# Patient Record
Sex: MALE | Race: OTHER | NOT HISPANIC OR LATINO | URBAN - METROPOLITAN AREA
[De-identification: names, ages, dates, MRNs, and addresses within clinical notes are randomized per-mention and may not be internally consistent; named-entity substitution may affect disease eponyms.]

---

## 2017-04-14 ENCOUNTER — INPATIENT (INPATIENT)
Facility: HOSPITAL | Age: 52
LOS: 2 days | Discharge: HOME | End: 2017-04-17
Attending: HOSPITALIST | Admitting: HOSPITALIST
Payer: MEDICAID

## 2017-04-14 VITALS
OXYGEN SATURATION: 98 % | HEART RATE: 94 BPM | TEMPERATURE: 97 F | SYSTOLIC BLOOD PRESSURE: 152 MMHG | RESPIRATION RATE: 18 BRPM | DIASTOLIC BLOOD PRESSURE: 108 MMHG

## 2017-04-14 LAB
ALBUMIN SERPL ELPH-MCNC: 3.9 G/DL — SIGNIFICANT CHANGE UP (ref 3.3–5)
ALP SERPL-CCNC: 68 U/L — SIGNIFICANT CHANGE UP (ref 40–120)
ALT FLD-CCNC: 28 U/L — SIGNIFICANT CHANGE UP (ref 4–41)
APAP SERPL-MCNC: < 15 UG/ML — LOW (ref 15–25)
AST SERPL-CCNC: 21 U/L — SIGNIFICANT CHANGE UP (ref 4–40)
BARBITURATES MEASUREMENT: NEGATIVE — SIGNIFICANT CHANGE UP
BASE EXCESS BLDV CALC-SCNC: -1.2 MMOL/L — SIGNIFICANT CHANGE UP
BASOPHILS # BLD AUTO: 0.02 K/UL — SIGNIFICANT CHANGE UP (ref 0–0.2)
BASOPHILS NFR BLD AUTO: 0.3 % — SIGNIFICANT CHANGE UP (ref 0–2)
BENZODIAZ SERPL-MCNC: NEGATIVE — SIGNIFICANT CHANGE UP
BILIRUB SERPL-MCNC: 0.5 MG/DL — SIGNIFICANT CHANGE UP (ref 0.2–1.2)
BLOOD GAS VENOUS - CREATININE: 0.98 MG/DL — SIGNIFICANT CHANGE UP (ref 0.5–1.3)
BUN SERPL-MCNC: 16 MG/DL — SIGNIFICANT CHANGE UP (ref 7–23)
CALCIUM SERPL-MCNC: 8.8 MG/DL — SIGNIFICANT CHANGE UP (ref 8.4–10.5)
CHLORIDE BLDV-SCNC: 102 MMOL/L — SIGNIFICANT CHANGE UP (ref 96–108)
CHLORIDE SERPL-SCNC: 92 MMOL/L — LOW (ref 98–107)
CO2 SERPL-SCNC: 21 MMOL/L — LOW (ref 22–31)
CREAT SERPL-MCNC: 1.06 MG/DL — SIGNIFICANT CHANGE UP (ref 0.5–1.3)
EOSINOPHIL # BLD AUTO: 0.15 K/UL — SIGNIFICANT CHANGE UP (ref 0–0.5)
EOSINOPHIL NFR BLD AUTO: 2.4 % — SIGNIFICANT CHANGE UP (ref 0–6)
ETHANOL BLD-MCNC: 253 MG/DL — HIGH
GAS PNL BLDV: 135 MMOL/L — LOW (ref 136–146)
GLUCOSE BLDV-MCNC: 492 — CRITICAL HIGH (ref 70–99)
GLUCOSE SERPL-MCNC: 609 MG/DL — CRITICAL HIGH (ref 70–99)
HCO3 BLDV-SCNC: 23 MMOL/L — SIGNIFICANT CHANGE UP (ref 20–27)
HCT VFR BLD CALC: 45.5 % — SIGNIFICANT CHANGE UP (ref 39–50)
HCT VFR BLDV CALC: 49.2 % — SIGNIFICANT CHANGE UP (ref 39–51)
HGB BLD-MCNC: 15.9 G/DL — SIGNIFICANT CHANGE UP (ref 13–17)
HGB BLDV-MCNC: 16.1 G/DL — SIGNIFICANT CHANGE UP (ref 13–17)
IMM GRANULOCYTES NFR BLD AUTO: 0.2 % — SIGNIFICANT CHANGE UP (ref 0–1.5)
LACTATE BLDV-MCNC: 3.8 MMOL/L — HIGH (ref 0.5–2)
LYMPHOCYTES # BLD AUTO: 2.51 K/UL — SIGNIFICANT CHANGE UP (ref 1–3.3)
LYMPHOCYTES # BLD AUTO: 40.3 % — SIGNIFICANT CHANGE UP (ref 13–44)
MCHC RBC-ENTMCNC: 32.6 PG — SIGNIFICANT CHANGE UP (ref 27–34)
MCHC RBC-ENTMCNC: 34.9 % — SIGNIFICANT CHANGE UP (ref 32–36)
MCV RBC AUTO: 93.4 FL — SIGNIFICANT CHANGE UP (ref 80–100)
MONOCYTES # BLD AUTO: 0.42 K/UL — SIGNIFICANT CHANGE UP (ref 0–0.9)
MONOCYTES NFR BLD AUTO: 6.7 % — SIGNIFICANT CHANGE UP (ref 2–14)
NEUTROPHILS # BLD AUTO: 3.12 K/UL — SIGNIFICANT CHANGE UP (ref 1.8–7.4)
NEUTROPHILS NFR BLD AUTO: 50.1 % — SIGNIFICANT CHANGE UP (ref 43–77)
PCO2 BLDV: 43 MMHG — SIGNIFICANT CHANGE UP (ref 41–51)
PH BLDV: 7.36 PH — SIGNIFICANT CHANGE UP (ref 7.32–7.43)
PLATELET # BLD AUTO: 163 K/UL — SIGNIFICANT CHANGE UP (ref 150–400)
PMV BLD: 10.7 FL — SIGNIFICANT CHANGE UP (ref 7–13)
PO2 BLDV: 50 MMHG — HIGH (ref 35–40)
POTASSIUM BLDV-SCNC: 3.5 MMOL/L — SIGNIFICANT CHANGE UP (ref 3.4–4.5)
POTASSIUM SERPL-MCNC: 3.6 MMOL/L — SIGNIFICANT CHANGE UP (ref 3.5–5.3)
POTASSIUM SERPL-SCNC: 3.6 MMOL/L — SIGNIFICANT CHANGE UP (ref 3.5–5.3)
PROT SERPL-MCNC: 7.4 G/DL — SIGNIFICANT CHANGE UP (ref 6–8.3)
RBC # BLD: 4.87 M/UL — SIGNIFICANT CHANGE UP (ref 4.2–5.8)
RBC # FLD: 12.9 % — SIGNIFICANT CHANGE UP (ref 10.3–14.5)
SALICYLATES SERPL-MCNC: < 5 MG/DL — LOW (ref 15–30)
SAO2 % BLDV: 79.6 % — SIGNIFICANT CHANGE UP (ref 60–85)
SODIUM SERPL-SCNC: 135 MMOL/L — SIGNIFICANT CHANGE UP (ref 135–145)
WBC # BLD: 6.23 K/UL — SIGNIFICANT CHANGE UP (ref 3.8–10.5)
WBC # FLD AUTO: 6.23 K/UL — SIGNIFICANT CHANGE UP (ref 3.8–10.5)

## 2017-04-14 RX ORDER — SODIUM CHLORIDE 9 MG/ML
1000 INJECTION INTRAMUSCULAR; INTRAVENOUS; SUBCUTANEOUS ONCE
Qty: 0 | Refills: 0 | Status: COMPLETED | OUTPATIENT
Start: 2017-04-14 | End: 2017-04-14

## 2017-04-14 RX ORDER — SODIUM CHLORIDE 9 MG/ML
2000 INJECTION INTRAMUSCULAR; INTRAVENOUS; SUBCUTANEOUS ONCE
Qty: 0 | Refills: 0 | Status: COMPLETED | OUTPATIENT
Start: 2017-04-14 | End: 2017-04-14

## 2017-04-14 RX ORDER — DIPHENHYDRAMINE HCL 50 MG
50 CAPSULE ORAL ONCE
Qty: 0 | Refills: 0 | Status: COMPLETED | OUTPATIENT
Start: 2017-04-14 | End: 2017-04-14

## 2017-04-14 RX ORDER — HALOPERIDOL DECANOATE 100 MG/ML
5 INJECTION INTRAMUSCULAR ONCE
Qty: 0 | Refills: 0 | Status: COMPLETED | OUTPATIENT
Start: 2017-04-14 | End: 2017-04-14

## 2017-04-14 RX ORDER — INSULIN LISPRO 100/ML
6 VIAL (ML) SUBCUTANEOUS ONCE
Qty: 0 | Refills: 0 | Status: COMPLETED | OUTPATIENT
Start: 2017-04-14 | End: 2017-04-14

## 2017-04-14 RX ADMIN — Medication 2 MILLIGRAM(S): at 23:39

## 2017-04-14 RX ADMIN — Medication 2 MILLIGRAM(S): at 21:23

## 2017-04-14 RX ADMIN — Medication 6 UNIT(S): at 23:11

## 2017-04-14 RX ADMIN — HALOPERIDOL DECANOATE 5 MILLIGRAM(S): 100 INJECTION INTRAMUSCULAR at 21:23

## 2017-04-14 RX ADMIN — Medication 50 MILLIGRAM(S): at 22:19

## 2017-04-14 RX ADMIN — SODIUM CHLORIDE 2000 MILLILITER(S): 9 INJECTION INTRAMUSCULAR; INTRAVENOUS; SUBCUTANEOUS at 23:12

## 2017-04-14 NOTE — ED ADULT NURSE NOTE - OBJECTIVE STATEMENT
rec'd pt in room 14 on 4 point restraints. pt agitated, restless, screaming, threatening staff..bleeding from head. pt on 1:1...safety maintained. placed on cardiac monitor and end tidal co2 monitor. small lac to left temporal region...pt agitated...area assessed and general hygiene performed. given meds as ordered. 18 gauge inserted rigth ac. bloods sent. for re-eval
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
## 2017-04-14 NOTE — ED PROVIDER NOTE - CRITICAL CARE PROVIDED
direct patient care (not related to procedure)/consult w/ pt's family directly relating to pts condition/interpretation of diagnostic studies/consultation with other physicians/additional history taking
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
## 2017-04-14 NOTE — ED PROVIDER NOTE - MEDICAL DECISION MAKING DETAILS
Pt p/w acute alcohol intoxication and aggressive behavior requiring chemical and mechanical restraints for his safety and the safety of the staff. Will obtain CTH/C spine, XR's chest and pelvis, staple scalp lac, obtain basic labs including toxicology screen, and reassess when sober.

## 2017-04-15 DIAGNOSIS — R73.9 HYPERGLYCEMIA, UNSPECIFIED: ICD-10-CM

## 2017-04-15 DIAGNOSIS — E11.9 TYPE 2 DIABETES MELLITUS WITHOUT COMPLICATIONS: ICD-10-CM

## 2017-04-15 DIAGNOSIS — Z41.8 ENCOUNTER FOR OTHER PROCEDURES FOR PURPOSES OTHER THAN REMEDYING HEALTH STATE: ICD-10-CM

## 2017-04-15 DIAGNOSIS — R94.31 ABNORMAL ELECTROCARDIOGRAM [ECG] [EKG]: ICD-10-CM

## 2017-04-15 DIAGNOSIS — F10.10 ALCOHOL ABUSE, UNCOMPLICATED: ICD-10-CM

## 2017-04-15 LAB
ALBUMIN SERPL ELPH-MCNC: 3.7 G/DL — SIGNIFICANT CHANGE UP (ref 3.3–5)
ALP SERPL-CCNC: 57 U/L — SIGNIFICANT CHANGE UP (ref 40–120)
ALT FLD-CCNC: 29 U/L — SIGNIFICANT CHANGE UP (ref 4–41)
AMPHET UR-MCNC: NEGATIVE — SIGNIFICANT CHANGE UP
APPEARANCE UR: CLEAR — SIGNIFICANT CHANGE UP
AST SERPL-CCNC: 27 U/L — SIGNIFICANT CHANGE UP (ref 4–40)
BARBITURATES UR SCN-MCNC: NEGATIVE — SIGNIFICANT CHANGE UP
BENZODIAZ UR-MCNC: NEGATIVE — SIGNIFICANT CHANGE UP
BILIRUB SERPL-MCNC: 0.5 MG/DL — SIGNIFICANT CHANGE UP (ref 0.2–1.2)
BILIRUB UR-MCNC: NEGATIVE — SIGNIFICANT CHANGE UP
BLOOD UR QL VISUAL: NEGATIVE — SIGNIFICANT CHANGE UP
BUN SERPL-MCNC: 12 MG/DL — SIGNIFICANT CHANGE UP (ref 7–23)
BUN SERPL-MCNC: 14 MG/DL — SIGNIFICANT CHANGE UP (ref 7–23)
BUN SERPL-MCNC: 15 MG/DL — SIGNIFICANT CHANGE UP (ref 7–23)
CALCIUM SERPL-MCNC: 7.8 MG/DL — LOW (ref 8.4–10.5)
CALCIUM SERPL-MCNC: 7.8 MG/DL — LOW (ref 8.4–10.5)
CALCIUM SERPL-MCNC: 8.1 MG/DL — LOW (ref 8.4–10.5)
CANNABINOIDS UR-MCNC: NEGATIVE — SIGNIFICANT CHANGE UP
CHLORIDE SERPL-SCNC: 102 MMOL/L — SIGNIFICANT CHANGE UP (ref 98–107)
CHLORIDE SERPL-SCNC: 103 MMOL/L — SIGNIFICANT CHANGE UP (ref 98–107)
CHLORIDE SERPL-SCNC: 107 MMOL/L — SIGNIFICANT CHANGE UP (ref 98–107)
CK MB BLD-MCNC: 1 — SIGNIFICANT CHANGE UP (ref 0–2.5)
CK MB BLD-MCNC: 1.88 NG/ML — SIGNIFICANT CHANGE UP (ref 1–6.6)
CK MB BLD-MCNC: 2.36 NG/ML — SIGNIFICANT CHANGE UP (ref 1–6.6)
CK MB BLD-MCNC: 2.61 NG/ML — SIGNIFICANT CHANGE UP (ref 1–6.6)
CK SERPL-CCNC: 235 U/L — HIGH (ref 30–200)
CK SERPL-CCNC: 280 U/L — HIGH (ref 30–200)
CO2 SERPL-SCNC: 20 MMOL/L — LOW (ref 22–31)
CO2 SERPL-SCNC: 21 MMOL/L — LOW (ref 22–31)
CO2 SERPL-SCNC: 22 MMOL/L — SIGNIFICANT CHANGE UP (ref 22–31)
COCAINE METAB.OTHER UR-MCNC: NEGATIVE — SIGNIFICANT CHANGE UP
COLOR SPEC: SIGNIFICANT CHANGE UP
CREAT SERPL-MCNC: 0.74 MG/DL — SIGNIFICANT CHANGE UP (ref 0.5–1.3)
CREAT SERPL-MCNC: 0.82 MG/DL — SIGNIFICANT CHANGE UP (ref 0.5–1.3)
CREAT SERPL-MCNC: 0.9 MG/DL — SIGNIFICANT CHANGE UP (ref 0.5–1.3)
GLUCOSE SERPL-MCNC: 155 MG/DL — HIGH (ref 70–99)
GLUCOSE SERPL-MCNC: 253 MG/DL — HIGH (ref 70–99)
GLUCOSE SERPL-MCNC: 311 MG/DL — HIGH (ref 70–99)
GLUCOSE UR-MCNC: >1000 — SIGNIFICANT CHANGE UP
KETONES UR-MCNC: SIGNIFICANT CHANGE UP
LEUKOCYTE ESTERASE UR-ACNC: NEGATIVE — SIGNIFICANT CHANGE UP
MAGNESIUM SERPL-MCNC: 1.9 MG/DL — SIGNIFICANT CHANGE UP (ref 1.6–2.6)
MAGNESIUM SERPL-MCNC: 2 MG/DL — SIGNIFICANT CHANGE UP (ref 1.6–2.6)
METHADONE UR-MCNC: NEGATIVE — SIGNIFICANT CHANGE UP
MUCOUS THREADS # UR AUTO: SIGNIFICANT CHANGE UP
NITRITE UR-MCNC: NEGATIVE — SIGNIFICANT CHANGE UP
OPIATES UR-MCNC: NEGATIVE — SIGNIFICANT CHANGE UP
OXYCODONE UR-MCNC: NEGATIVE — SIGNIFICANT CHANGE UP
PCP UR-MCNC: NEGATIVE — SIGNIFICANT CHANGE UP
PH UR: 6 — SIGNIFICANT CHANGE UP (ref 4.6–8)
PHOSPHATE SERPL-MCNC: 2.2 MG/DL — LOW (ref 2.5–4.5)
PHOSPHATE SERPL-MCNC: 2.4 MG/DL — LOW (ref 2.5–4.5)
POTASSIUM SERPL-MCNC: 3.7 MMOL/L — SIGNIFICANT CHANGE UP (ref 3.5–5.3)
POTASSIUM SERPL-MCNC: 3.7 MMOL/L — SIGNIFICANT CHANGE UP (ref 3.5–5.3)
POTASSIUM SERPL-MCNC: 4.1 MMOL/L — SIGNIFICANT CHANGE UP (ref 3.5–5.3)
POTASSIUM SERPL-SCNC: 3.7 MMOL/L — SIGNIFICANT CHANGE UP (ref 3.5–5.3)
POTASSIUM SERPL-SCNC: 3.7 MMOL/L — SIGNIFICANT CHANGE UP (ref 3.5–5.3)
POTASSIUM SERPL-SCNC: 4.1 MMOL/L — SIGNIFICANT CHANGE UP (ref 3.5–5.3)
PROT SERPL-MCNC: 6.7 G/DL — SIGNIFICANT CHANGE UP (ref 6–8.3)
PROT UR-MCNC: NEGATIVE — SIGNIFICANT CHANGE UP
RBC CASTS # UR COMP ASSIST: SIGNIFICANT CHANGE UP (ref 0–?)
SODIUM SERPL-SCNC: 139 MMOL/L — SIGNIFICANT CHANGE UP (ref 135–145)
SODIUM SERPL-SCNC: 140 MMOL/L — SIGNIFICANT CHANGE UP (ref 135–145)
SODIUM SERPL-SCNC: 143 MMOL/L — SIGNIFICANT CHANGE UP (ref 135–145)
SP GR SPEC: 1.03 — HIGH (ref 1–1.03)
TROPONIN T SERPL-MCNC: < 0.06 NG/ML — SIGNIFICANT CHANGE UP (ref 0–0.06)
UROBILINOGEN FLD QL: NORMAL E.U. — SIGNIFICANT CHANGE UP (ref 0.1–0.2)
WBC UR QL: SIGNIFICANT CHANGE UP (ref 0–?)

## 2017-04-15 PROCEDURE — 71010: CPT | Mod: 26

## 2017-04-15 PROCEDURE — 72125 CT NECK SPINE W/O DYE: CPT | Mod: 26

## 2017-04-15 PROCEDURE — 70450 CT HEAD/BRAIN W/O DYE: CPT | Mod: 26

## 2017-04-15 PROCEDURE — 93010 ELECTROCARDIOGRAM REPORT: CPT

## 2017-04-15 PROCEDURE — 99223 1ST HOSP IP/OBS HIGH 75: CPT | Mod: GC

## 2017-04-15 PROCEDURE — 72170 X-RAY EXAM OF PELVIS: CPT | Mod: 26

## 2017-04-15 RX ORDER — GLUCAGON INJECTION, SOLUTION 0.5 MG/.1ML
1 INJECTION, SOLUTION SUBCUTANEOUS ONCE
Qty: 0 | Refills: 0 | Status: DISCONTINUED | OUTPATIENT
Start: 2017-04-15 | End: 2017-04-17

## 2017-04-15 RX ORDER — THIAMINE MONONITRATE (VIT B1) 100 MG
100 TABLET ORAL DAILY
Qty: 0 | Refills: 0 | Status: COMPLETED | OUTPATIENT
Start: 2017-04-15 | End: 2017-04-17

## 2017-04-15 RX ORDER — FOLIC ACID 0.8 MG
1 TABLET ORAL DAILY
Qty: 0 | Refills: 0 | Status: DISCONTINUED | OUTPATIENT
Start: 2017-04-15 | End: 2017-04-17

## 2017-04-15 RX ORDER — SODIUM CHLORIDE 9 MG/ML
1000 INJECTION, SOLUTION INTRAVENOUS
Qty: 0 | Refills: 0 | Status: DISCONTINUED | OUTPATIENT
Start: 2017-04-15 | End: 2017-04-17

## 2017-04-15 RX ORDER — INSULIN LISPRO 100/ML
VIAL (ML) SUBCUTANEOUS
Qty: 0 | Refills: 0 | Status: DISCONTINUED | OUTPATIENT
Start: 2017-04-15 | End: 2017-04-17

## 2017-04-15 RX ORDER — INSULIN LISPRO 100/ML
VIAL (ML) SUBCUTANEOUS AT BEDTIME
Qty: 0 | Refills: 0 | Status: DISCONTINUED | OUTPATIENT
Start: 2017-04-15 | End: 2017-04-17

## 2017-04-15 RX ORDER — SODIUM,POTASSIUM PHOSPHATES 278-250MG
1 POWDER IN PACKET (EA) ORAL
Qty: 0 | Refills: 0 | Status: COMPLETED | OUTPATIENT
Start: 2017-04-15 | End: 2017-04-16

## 2017-04-15 RX ORDER — DEXTROSE 50 % IN WATER 50 %
12.5 SYRINGE (ML) INTRAVENOUS ONCE
Qty: 0 | Refills: 0 | Status: DISCONTINUED | OUTPATIENT
Start: 2017-04-15 | End: 2017-04-17

## 2017-04-15 RX ORDER — DEXTROSE 50 % IN WATER 50 %
25 SYRINGE (ML) INTRAVENOUS ONCE
Qty: 0 | Refills: 0 | Status: DISCONTINUED | OUTPATIENT
Start: 2017-04-15 | End: 2017-04-17

## 2017-04-15 RX ORDER — SODIUM CHLORIDE 9 MG/ML
1000 INJECTION INTRAMUSCULAR; INTRAVENOUS; SUBCUTANEOUS
Qty: 0 | Refills: 0 | Status: DISCONTINUED | OUTPATIENT
Start: 2017-04-15 | End: 2017-04-17

## 2017-04-15 RX ORDER — DEXTROSE 50 % IN WATER 50 %
1 SYRINGE (ML) INTRAVENOUS ONCE
Qty: 0 | Refills: 0 | Status: DISCONTINUED | OUTPATIENT
Start: 2017-04-15 | End: 2017-04-17

## 2017-04-15 RX ORDER — ENOXAPARIN SODIUM 100 MG/ML
40 INJECTION SUBCUTANEOUS DAILY
Qty: 0 | Refills: 0 | Status: DISCONTINUED | OUTPATIENT
Start: 2017-04-15 | End: 2017-04-17

## 2017-04-15 RX ORDER — INSULIN GLARGINE 100 [IU]/ML
10 INJECTION, SOLUTION SUBCUTANEOUS AT BEDTIME
Qty: 0 | Refills: 0 | Status: DISCONTINUED | OUTPATIENT
Start: 2017-04-15 | End: 2017-04-16

## 2017-04-15 RX ORDER — SODIUM,POTASSIUM PHOSPHATES 278-250MG
1 POWDER IN PACKET (EA) ORAL
Qty: 0 | Refills: 0 | Status: DISCONTINUED | OUTPATIENT
Start: 2017-04-15 | End: 2017-04-15

## 2017-04-15 RX ADMIN — SODIUM CHLORIDE 100 MILLILITER(S): 9 INJECTION INTRAMUSCULAR; INTRAVENOUS; SUBCUTANEOUS at 23:08

## 2017-04-15 RX ADMIN — SODIUM CHLORIDE 1000 MILLILITER(S): 9 INJECTION INTRAMUSCULAR; INTRAVENOUS; SUBCUTANEOUS at 01:19

## 2017-04-15 RX ADMIN — Medication 50 MILLIGRAM(S): at 07:26

## 2017-04-15 RX ADMIN — Medication: at 07:24

## 2017-04-15 RX ADMIN — Medication 63.75 MILLIMOLE(S): at 14:58

## 2017-04-15 RX ADMIN — ENOXAPARIN SODIUM 40 MILLIGRAM(S): 100 INJECTION SUBCUTANEOUS at 13:49

## 2017-04-15 RX ADMIN — Medication 1 TABLET(S): at 13:50

## 2017-04-15 RX ADMIN — Medication 1 MILLIGRAM(S): at 01:43

## 2017-04-15 RX ADMIN — Medication 100 MILLIGRAM(S): at 13:50

## 2017-04-15 RX ADMIN — Medication 1: at 18:07

## 2017-04-15 RX ADMIN — SODIUM CHLORIDE 100 MILLILITER(S): 9 INJECTION INTRAMUSCULAR; INTRAVENOUS; SUBCUTANEOUS at 11:58

## 2017-04-15 RX ADMIN — Medication: at 12:47

## 2017-04-15 RX ADMIN — Medication 1 MILLIGRAM(S): at 13:50

## 2017-04-15 RX ADMIN — INSULIN GLARGINE 10 UNIT(S): 100 INJECTION, SOLUTION SUBCUTANEOUS at 23:07

## 2017-04-15 NOTE — H&P ADULT. - ATTENDING COMMENTS
Pt was seen and examed at bed side with resident.  52 yo M w/ PMH of DM presents with head laceration, aggression and AMS 2/2 to alcohol intoxication without signs of ICH, found to have hyperglycemia likely 2/2 DM without signs of DKA or HHS, also found to have ST changes in his anterior leads without other signs and symptoms of ACS. symptom triggered CIWA/ativan protocol, folate, MVI. Abnormal EKG, pt asymptomatic, serial Milo, ASA. consider out patient cardiology evaluation if pt symptomatic, optimizing DM management, alcohol abstinence. Uncontrolled DMII, medication non-compliance, Hg A1C 12, started lantus, HISS, need DM education. consider Endo consult. Liprid profile.

## 2017-04-15 NOTE — PATIENT PROFILE ADULT. - NS TRANSFER DISPOSITION PATIENT BELONGINGS
cell phone and wallet with [id number] retrieved from security and given to patient. Patient aware we are not responsible for belongings.. Copy of valuable taken from security in chart./with patient

## 2017-04-15 NOTE — H&P ADULT. - ASSESSMENT
50 yo M w/ PMH of DM presents with head laceration, aggression and AMS 2/2 to alcohol intoxication without signs of ICH, found to have hyperglycemia likely 2/2 DM without signs of DKA or HHS, also found to have ST changes in his anterior leads without other signs and symptoms of ACS. 50 yo M w/ PMH of DM presents with head laceration, aggression and AMS 2/2 to alcohol intoxication without signs of ICH, found to have hyperglycemia likely 2/2 DM without signs of DKA or HHS, also found to have ST changes in his anterior leads without other signs and symptoms of ACS. According to patient's history obtained from family, patient at low risk of alcohol withdrawal as this episode was due to a single alcoholic binge and that patient does not drink daily.

## 2017-04-15 NOTE — ED ADULT NURSE REASSESSMENT NOTE - CONDITION
improved/pt easily arousable but slightly drowsy.  admits to having 3 beers and several shots last night. pt cooperative at this time. verbal frepofrt given to caterina mejia.  king for tfr to floor while on co. safety maintained
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
## 2017-04-15 NOTE — H&P ADULT. - EKG AND INTERPRETATION
RRR, QTc 464, ST depressions in V1 to V4, T-wave inversion and q-wave in III RRR, QTc 464, widen QRS V1 to V4, T-wave inversion and q-wave in III

## 2017-04-15 NOTE — H&P ADULT. - PROBLEM SELECTOR PLAN 3
- monitor for signs of ACS  - serials EKGs, serial cardiac enzymes - monitor for signs of ACS  - serials EKGs, serial cardiac enzymes  - [facility] cardiology reviewed EKG, Pt asymptomatic, no indication for inpatient cardiac work up.

## 2017-04-15 NOTE — H&P ADULT. - PROBLEM SELECTOR PLAN 1
- Librium taper  - CIWA triggered Ativan prn  - monitor mental status  - monitor for signs of alcoholic seizures, hallucinosis, DTs  - Social work consult - Holding benzo taper as patient ts lower risk of alcohol withdrawal  - CIWA triggered Ativan prn  - monitor mental status  - monitor for signs of alcohol withdrawal, increase in CIWA, alcoholic seizures, hallucinosis, DTs - Holding benzo taper as patient ts lower risk of alcohol withdrawal  - CIWA triggered Ativan prn  - monitor mental status  - monitor for signs of alcohol withdrawal, increase in CIWA, alcoholic seizures, hallucinosis, DTs  - SW consult

## 2017-04-15 NOTE — H&P ADULT. - PROBLEM SELECTOR PLAN 2
- Fingersticks premeal, ISS, Lantus bedtime  - monitor anion gap - Fingersticks premeal, ISS, Lantus bedtime  - monitor anion gap  - IVF resuscitation  - patient will need diabetes education, new medication regime on discharge -mild DKA with postive anion gap, trace ketone in UA. improved  - Fingersticks premeal, ISS, Lantus bedtime  - monitor anion gap, once closed, will start oral diet as tolerated if mental status improving.  - IVF resuscitation  - medication non-compliance, patient will need diabetes education, new medication regime on discharge, consider Endo consult

## 2017-04-15 NOTE — H&P ADULT. - RADIOLOGY RESULTS AND INTERPRETATION
CT head, C-spine, showed L thyroid nodule, no ICH or fracture. Pelvis, chest XR showed no fracture, clear lungs.

## 2017-04-15 NOTE — ED ADULT NURSE REASSESSMENT NOTE - CONDITION
pt became restless...attempted to climb oob, pulled out iv....proveided urinal...20 gauge inserted right ac. 3rd iv infusing.... .pt calmer... resting quietly

## 2017-04-16 LAB
ALBUMIN SERPL ELPH-MCNC: 3.6 G/DL — SIGNIFICANT CHANGE UP (ref 3.3–5)
ALP SERPL-CCNC: 62 U/L — SIGNIFICANT CHANGE UP (ref 40–120)
ALT FLD-CCNC: 25 U/L — SIGNIFICANT CHANGE UP (ref 4–41)
AST SERPL-CCNC: 25 U/L — SIGNIFICANT CHANGE UP (ref 4–40)
BILIRUB SERPL-MCNC: 1.2 MG/DL — SIGNIFICANT CHANGE UP (ref 0.2–1.2)
BUN SERPL-MCNC: 13 MG/DL — SIGNIFICANT CHANGE UP (ref 7–23)
CALCIUM SERPL-MCNC: 8 MG/DL — LOW (ref 8.4–10.5)
CHLORIDE SERPL-SCNC: 103 MMOL/L — SIGNIFICANT CHANGE UP (ref 98–107)
CHOLEST SERPL-MCNC: 144 MG/DL — SIGNIFICANT CHANGE UP (ref 120–199)
CO2 SERPL-SCNC: 22 MMOL/L — SIGNIFICANT CHANGE UP (ref 22–31)
CREAT SERPL-MCNC: 0.72 MG/DL — SIGNIFICANT CHANGE UP (ref 0.5–1.3)
GLUCOSE SERPL-MCNC: 174 MG/DL — HIGH (ref 70–99)
HDLC SERPL-MCNC: 55 MG/DL — SIGNIFICANT CHANGE UP (ref 35–55)
LIPID PNL WITH DIRECT LDL SERPL: 81 MG/DL — SIGNIFICANT CHANGE UP
MAGNESIUM SERPL-MCNC: 2 MG/DL — SIGNIFICANT CHANGE UP (ref 1.6–2.6)
PHOSPHATE SERPL-MCNC: 2.2 MG/DL — LOW (ref 2.5–4.5)
POTASSIUM SERPL-MCNC: 4.2 MMOL/L — SIGNIFICANT CHANGE UP (ref 3.5–5.3)
POTASSIUM SERPL-SCNC: 4.2 MMOL/L — SIGNIFICANT CHANGE UP (ref 3.5–5.3)
PROT SERPL-MCNC: 6.7 G/DL — SIGNIFICANT CHANGE UP (ref 6–8.3)
SODIUM SERPL-SCNC: 140 MMOL/L — SIGNIFICANT CHANGE UP (ref 135–145)
T4 FREE SERPL-MCNC: 1.38 NG/DL — SIGNIFICANT CHANGE UP (ref 0.9–1.8)
TRIGL SERPL-MCNC: 79 MG/DL — SIGNIFICANT CHANGE UP (ref 10–149)
TSH SERPL-MCNC: 0.55 UIU/ML — SIGNIFICANT CHANGE UP (ref 0.27–4.2)

## 2017-04-16 PROCEDURE — 99233 SBSQ HOSP IP/OBS HIGH 50: CPT | Mod: GC

## 2017-04-16 RX ORDER — ATORVASTATIN CALCIUM 80 MG/1
40 TABLET, FILM COATED ORAL AT BEDTIME
Qty: 0 | Refills: 0 | Status: DISCONTINUED | OUTPATIENT
Start: 2017-04-16 | End: 2017-04-17

## 2017-04-16 RX ORDER — INSULIN GLARGINE 100 [IU]/ML
12 INJECTION, SOLUTION SUBCUTANEOUS AT BEDTIME
Qty: 0 | Refills: 0 | Status: DISCONTINUED | OUTPATIENT
Start: 2017-04-16 | End: 2017-04-17

## 2017-04-16 RX ORDER — METFORMIN HYDROCHLORIDE 850 MG/1
500 TABLET ORAL
Qty: 0 | Refills: 0 | Status: DISCONTINUED | OUTPATIENT
Start: 2017-04-16 | End: 2017-04-16

## 2017-04-16 RX ORDER — LISINOPRIL 2.5 MG/1
5 TABLET ORAL DAILY
Qty: 0 | Refills: 0 | Status: DISCONTINUED | OUTPATIENT
Start: 2017-04-16 | End: 2017-04-17

## 2017-04-16 RX ORDER — INSULIN GLARGINE 100 [IU]/ML
12 INJECTION, SOLUTION SUBCUTANEOUS AT BEDTIME
Qty: 0 | Refills: 0 | Status: DISCONTINUED | OUTPATIENT
Start: 2017-04-16 | End: 2017-04-16

## 2017-04-16 RX ORDER — LISINOPRIL 2.5 MG/1
5 TABLET ORAL DAILY
Qty: 0 | Refills: 0 | Status: DISCONTINUED | OUTPATIENT
Start: 2017-04-16 | End: 2017-04-16

## 2017-04-16 RX ORDER — LABETALOL HCL 100 MG
100 TABLET ORAL ONCE
Qty: 0 | Refills: 0 | Status: DISCONTINUED | OUTPATIENT
Start: 2017-04-16 | End: 2017-04-16

## 2017-04-16 RX ADMIN — Medication 2: at 12:00

## 2017-04-16 RX ADMIN — INSULIN GLARGINE 12 UNIT(S): 100 INJECTION, SOLUTION SUBCUTANEOUS at 22:25

## 2017-04-16 RX ADMIN — SODIUM CHLORIDE 100 MILLILITER(S): 9 INJECTION INTRAMUSCULAR; INTRAVENOUS; SUBCUTANEOUS at 22:25

## 2017-04-16 RX ADMIN — Medication 1 TABLET(S): at 12:00

## 2017-04-16 RX ADMIN — ENOXAPARIN SODIUM 40 MILLIGRAM(S): 100 INJECTION SUBCUTANEOUS at 12:00

## 2017-04-16 RX ADMIN — Medication 100 MILLIGRAM(S): at 12:00

## 2017-04-16 RX ADMIN — Medication 1 TABLET(S): at 08:33

## 2017-04-16 RX ADMIN — ATORVASTATIN CALCIUM 40 MILLIGRAM(S): 80 TABLET, FILM COATED ORAL at 22:25

## 2017-04-16 RX ADMIN — Medication 1 TABLET(S): at 17:44

## 2017-04-16 RX ADMIN — Medication 1: at 17:44

## 2017-04-16 RX ADMIN — Medication 1 MILLIGRAM(S): at 12:00

## 2017-04-16 RX ADMIN — SODIUM CHLORIDE 100 MILLILITER(S): 9 INJECTION INTRAMUSCULAR; INTRAVENOUS; SUBCUTANEOUS at 08:43

## 2017-04-16 RX ADMIN — Medication 1: at 08:33

## 2017-04-16 RX ADMIN — LISINOPRIL 5 MILLIGRAM(S): 2.5 TABLET ORAL at 12:24
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
## 2017-04-16 NOTE — DISCHARGE NOTE ADULT - MEDICATION SUMMARY - MEDICATIONS TO TAKE
I will START or STAY ON the medications listed below when I get home from the hospital:    lisinopril 5 mg oral tablet  -- 1 tab(s) by mouth once a day  -- Indication: For Diabetes mellitus    metFORMIN 500 mg oral tablet, extended release  -- 1 tab(s) by mouth 2 times a day  -- Check with your doctor before becoming pregnant.  Do not drink alcoholic beverages when taking this medication.  Obtain medical advice before taking any non-prescription drugs as some may affect the action of this medication.  Swallow whole.  Do not crush.  Take with food or milk.    -- Indication: For Diabetes mellitus    glimepiride 2 mg oral tablet  -- 1 tab(s) by mouth once a day  -- Avoid prolonged or excessive exposure to direct and/or artificial sunlight while taking this medication.  Do not drink alcoholic beverages when taking this medication.  It is very important that you take or use this exactly as directed.  Do not skip doses or discontinue unless directed by your doctor.    -- Indication: For Diabetes mellitus    atorvastatin 40 mg oral tablet  -- 1 tab(s) by mouth once a day (at bedtime)  -- Indication: For Diabetes mellitus    Multiple Vitamins oral tablet  -- 1 tab(s) by mouth once a day  -- Indication: For Alcohol abuse

## 2017-04-16 NOTE — DISCHARGE NOTE ADULT - PLAN OF CARE
Resolution Please control your alcohol consumption to health amounts. Please talk with your primary care doctor about your alcohol use. You were found to have very elevated blood sugars with an A1C of 12.2. Please continue taking the metformin and talk with your primary care doctor about your diabetes. You were found to have high blood pressure. Please continue taking the lisinopril and talk with your primary care doctor about your blood pressure. Manage disease process, prevent complications Manage disease process, prevent complications.

## 2017-04-16 NOTE — DISCHARGE NOTE ADULT - CARE PLAN
Principal Discharge DX:	Alcohol abuse  Goal:	Resolution  Instructions for follow-up, activity and diet:	Please control your alcohol consumption to health amounts. Please talk with your primary care doctor about your alcohol use.  Secondary Diagnosis:	Diabetes mellitus  Instructions for follow-up, activity and diet:	You were found to have very elevated blood sugars with an A1C of 12.2. Please continue taking the metformin and talk with your primary care doctor about your diabetes.  Secondary Diagnosis:	Hypertension  Instructions for follow-up, activity and diet:	You were found to have high blood pressure. Please continue taking the lisinopril and talk with your primary care doctor about your blood pressure. Principal Discharge DX:	Alcohol abuse  Goal:	Resolution  Instructions for follow-up, activity and diet:	Please control your alcohol consumption to health amounts. Please talk with your primary care doctor about your alcohol use.  Secondary Diagnosis:	Diabetes mellitus  Goal:	Manage disease process, prevent complications  Instructions for follow-up, activity and diet:	You were found to have very elevated blood sugars with an A1C of 12.2. Please continue taking the metformin and talk with your primary care doctor about your diabetes.  Secondary Diagnosis:	Hypertension  Goal:	Manage disease process, prevent complications  Instructions for follow-up, activity and diet:	You were found to have high blood pressure. Please continue taking the lisinopril and talk with your primary care doctor about your blood pressure. Principal Discharge DX:	Alcohol abuse  Goal:	Resolution  Instructions for follow-up, activity and diet:	Please control your alcohol consumption to health amounts. Please talk with your primary care doctor about your alcohol use.  Secondary Diagnosis:	Diabetes mellitus  Goal:	Manage disease process, prevent complications.  Instructions for follow-up, activity and diet:	You were found to have very elevated blood sugars with an A1C of 12.2. Please continue taking the metformin and talk with your primary care doctor about your diabetes.  Secondary Diagnosis:	Hypertension  Goal:	Manage disease process, prevent complications  Instructions for follow-up, activity and diet:	You were found to have high blood pressure. Please continue taking the lisinopril and talk with your primary care doctor about your blood pressure.

## 2017-04-16 NOTE — DISCHARGE NOTE ADULT - PATIENT PORTAL LINK FT
“You can access the FollowHealth Patient Portal, offered by [facility], by registering with the following website: [email or web address]”

## 2017-04-16 NOTE — DISCHARGE NOTE ADULT - CARE PROVIDER_API CALL
PRABHU Montenegro  Phone: (   )    -  Fax: (   )    - Nohemi French), Internal Medicine  [address] [place] [address]  Phone: [phone]  Fax: [phone]

## 2017-04-16 NOTE — DISCHARGE NOTE ADULT - ADDITIONAL INSTRUCTIONS
Make an appointment to see a primary care physician within 1-2 weeks of discharge; discuss management of diabetes as well as assessing the staples on your head and when staples can be removed.

## 2017-04-17 VITALS
TEMPERATURE: 98 F | OXYGEN SATURATION: 99 % | RESPIRATION RATE: 18 BRPM | DIASTOLIC BLOOD PRESSURE: 94 MMHG | SYSTOLIC BLOOD PRESSURE: 149 MMHG | HEART RATE: 67 BPM

## 2017-04-17 LAB
BUN SERPL-MCNC: 13 MG/DL — SIGNIFICANT CHANGE UP (ref 7–23)
CALCIUM SERPL-MCNC: 8.2 MG/DL — LOW (ref 8.4–10.5)
CHLORIDE SERPL-SCNC: 105 MMOL/L — SIGNIFICANT CHANGE UP (ref 98–107)
CO2 SERPL-SCNC: 19 MMOL/L — LOW (ref 22–31)
CREAT SERPL-MCNC: 0.7 MG/DL — SIGNIFICANT CHANGE UP (ref 0.5–1.3)
GLUCOSE SERPL-MCNC: 182 MG/DL — HIGH (ref 70–99)
MAGNESIUM SERPL-MCNC: 1.9 MG/DL — SIGNIFICANT CHANGE UP (ref 1.6–2.6)
PHOSPHATE SERPL-MCNC: 2.3 MG/DL — LOW (ref 2.5–4.5)
POTASSIUM SERPL-MCNC: 3.8 MMOL/L — SIGNIFICANT CHANGE UP (ref 3.5–5.3)
POTASSIUM SERPL-SCNC: 3.8 MMOL/L — SIGNIFICANT CHANGE UP (ref 3.5–5.3)
SODIUM SERPL-SCNC: 138 MMOL/L — SIGNIFICANT CHANGE UP (ref 135–145)

## 2017-04-17 PROCEDURE — 99239 HOSP IP/OBS DSCHRG MGMT >30: CPT

## 2017-04-17 RX ORDER — LISINOPRIL 2.5 MG/1
1 TABLET ORAL
Qty: 30 | Refills: 0 | OUTPATIENT
Start: 2017-04-17 | End: 2017-05-17

## 2017-04-17 RX ORDER — GLIMEPIRIDE 1 MG
1 TABLET ORAL
Qty: 30 | Refills: 0
Start: 2017-04-17 | End: 2017-05-17

## 2017-04-17 RX ORDER — GLIMEPIRIDE 1 MG
1 TABLET ORAL
Qty: 30 | Refills: 0 | OUTPATIENT
Start: 2017-04-17 | End: 2017-05-17

## 2017-04-17 RX ORDER — METFORMIN HYDROCHLORIDE 850 MG/1
1 TABLET ORAL
Qty: 60 | Refills: 0 | OUTPATIENT
Start: 2017-04-17 | End: 2017-05-17

## 2017-04-17 RX ORDER — LISINOPRIL 2.5 MG/1
1 TABLET ORAL
Qty: 30 | Refills: 0
Start: 2017-04-17 | End: 2017-05-17

## 2017-04-17 RX ORDER — SODIUM,POTASSIUM PHOSPHATES 278-250MG
1 POWDER IN PACKET (EA) ORAL
Qty: 0 | Refills: 0 | Status: DISCONTINUED | OUTPATIENT
Start: 2017-04-17 | End: 2017-04-17

## 2017-04-17 RX ORDER — METFORMIN HYDROCHLORIDE 850 MG/1
1 TABLET ORAL
Qty: 60 | Refills: 0
Start: 2017-04-17 | End: 2017-05-17

## 2017-04-17 RX ORDER — ATORVASTATIN CALCIUM 80 MG/1
1 TABLET, FILM COATED ORAL
Qty: 30 | Refills: 0
Start: 2017-04-17 | End: 2017-05-17

## 2017-04-17 RX ORDER — ATORVASTATIN CALCIUM 80 MG/1
1 TABLET, FILM COATED ORAL
Qty: 30 | Refills: 0 | OUTPATIENT
Start: 2017-04-17 | End: 2017-05-17

## 2017-04-17 RX ADMIN — SODIUM CHLORIDE 100 MILLILITER(S): 9 INJECTION INTRAMUSCULAR; INTRAVENOUS; SUBCUTANEOUS at 08:46

## 2017-04-17 RX ADMIN — Medication 2: at 11:48

## 2017-04-17 RX ADMIN — Medication 1 MILLIGRAM(S): at 11:51

## 2017-04-17 RX ADMIN — Medication 1 TABLET(S): at 11:49

## 2017-04-17 RX ADMIN — Medication 1 TABLET(S): at 11:51

## 2017-04-17 RX ADMIN — Medication 100 MILLIGRAM(S): at 11:52

## 2017-04-17 RX ADMIN — ENOXAPARIN SODIUM 40 MILLIGRAM(S): 100 INJECTION SUBCUTANEOUS at 11:48

## 2017-04-17 RX ADMIN — Medication 1: at 08:29

## 2017-04-17 RX ADMIN — LISINOPRIL 5 MILLIGRAM(S): 2.5 TABLET ORAL at 07:08

## 2018-11-26 NOTE — PATIENT PROFILE ADULT. - NSALCOHOLLASTUSE_GEN_A_CORE_DT
Patient alert with confusion, patient on nectar thick liquids and mechanical soft diet, patient is assist with feeding. Patient on Cpap at night and while asleep, currently sitting up in bed eating breakfast. Patient prescriptions and discharge packet sent with patient to [facility]. Patient discharged via ambulance, IV and Telemetry removed.     14-Apr-2017

## 2019-01-01 NOTE — DISCHARGE NOTE ADULT - NS MD DC PLAN IMMU FLU REF OTH
Refused I will START or STAY ON the medications listed below when I get home from the hospital:  None

## 2020-07-15 NOTE — PATIENT PROFILE ADULT. - FUNCTIONAL SCREEN CURRENT LEVEL: TOILETING, MLM
Disregard previous message, Dr Espinoza is an [facility] doctor, so no order is needed.    (2) assistive person

## 2022-07-06 NOTE — PATIENT PROFILE ADULT. - CAREGIVER PHONE NUMBER
Left non detailed VM for pt asking that they callback and schedule physical  Latosha EDWARD RN     [phone]

## 2022-10-19 NOTE — H&P ADULT. - LAB RESULTS AND INTERPRETATION
64M male with hx of bipolar 1 disorder/MDD, T2DM, BPH, presenting from [facility] w/agitation, intermittently refusing medication, found to incidentally have RBBB, now with nosocomial COVID.  On admission, Bicarb 21, glucose 609, with anion gap of 22. VBG CO2 of 43, pH 7.36. Repeat BMP with Bicarb of 20, glucose of 311, anion gap of 16. A1c 12.2. On admission, Bicarb 21, glucose 609, with anion gap of 22. VBG CO2 of 43, pH 7.36. Repeat BMP with Bicarb of 20, glucose of 311, anion gap of 16. A1c 12.2. Ethanol 253. UA w/ >1000 glucose, trace ketones, no pyuria.

## 2023-10-23 NOTE — H&P ADULT. - BACK EXAM
Hypertension is improving with treatment.  Continue current treatment regimen.  Blood pressure will be reassessed at the next regular appointment.   normal/normal shape

## 2024-01-02 NOTE — DISCHARGE NOTE ADULT - PROVIDER RX CONTACT NUMBER
Fasting lab orders including blood in urine placed  
Patient notified Verbalized understanding    
Please advise . Orders are pended.   
Pt wife is calling, her and her  are scheduled January 8th 2024 for [facility].     Pt is calling asking to have lab ordered placed so she can do these prior to this appointment.     Pt said the cardiologist also asked her to have labs done. Pt said cardiologist is through [facility]. Writer explained he himself may have to place those orders.     Pt is asking for \"cholesterol- red and white cell-the regular\" lab orders.     Pt was advised Dr Rose is out of the office until Jan 2nd.     Please advise pt when labs are placed.      Thank you   
[phone]

## 2024-02-10 NOTE — DISCHARGE NOTE ADULT - VISION (WITH CORRECTIVE LENSES IF THE PATIENT USUALLY WEARS THEM):
Group Therapy Note    Date: 2/10/2024    Group Start Time: 1115  Group End Time: 1155  Group Topic: Education Group - Inpatient    SSR 2 BH NON ACUTE    Rody Sanches        Group Therapy Note    Facilitated discussion focus on identifying different barriers that has prevented progress and identifying ways to confront them       Attendees: 2/9      Notes:  Encouraged but did not attend    Discipline Responsible: Recreational Therapist      Signature:  ALKA Suarez     Normal vision: sees adequately in most situations; can see medication labels, newsprint

## 2024-02-12 ENCOUNTER — INPATIENT (INPATIENT)
Facility: HOSPITAL | Age: 59
LOS: 4 days | Discharge: LEFT BEFORE DISCHARGE | DRG: 623 | End: 2024-02-17
Attending: STUDENT IN AN ORGANIZED HEALTH CARE EDUCATION/TRAINING PROGRAM | Admitting: STUDENT IN AN ORGANIZED HEALTH CARE EDUCATION/TRAINING PROGRAM
Payer: MEDICAID

## 2024-02-12 VITALS
OXYGEN SATURATION: 97 % | WEIGHT: 184.97 LBS | RESPIRATION RATE: 16 BRPM | HEART RATE: 77 BPM | DIASTOLIC BLOOD PRESSURE: 75 MMHG | HEIGHT: 70 IN | SYSTOLIC BLOOD PRESSURE: 116 MMHG | TEMPERATURE: 98 F

## 2024-02-12 DIAGNOSIS — L03.90 CELLULITIS, UNSPECIFIED: ICD-10-CM

## 2024-02-12 PROBLEM — E11.9 TYPE 2 DIABETES MELLITUS WITHOUT COMPLICATIONS: Chronic | Status: ACTIVE | Noted: 2017-04-15

## 2024-02-12 PROBLEM — F10.10 ALCOHOL ABUSE, UNCOMPLICATED: Chronic | Status: ACTIVE | Noted: 2017-04-14

## 2024-02-12 LAB
ALBUMIN SERPL ELPH-MCNC: 3.1 G/DL — LOW (ref 3.5–5)
ALP SERPL-CCNC: 59 U/L — SIGNIFICANT CHANGE UP (ref 40–120)
ALT FLD-CCNC: 26 U/L DA — SIGNIFICANT CHANGE UP (ref 10–60)
ANION GAP SERPL CALC-SCNC: 6 MMOL/L — SIGNIFICANT CHANGE UP (ref 5–17)
APTT BLD: 35.8 SEC — HIGH (ref 24.5–35.6)
AST SERPL-CCNC: 18 U/L — SIGNIFICANT CHANGE UP (ref 10–40)
BASOPHILS # BLD AUTO: 0.03 K/UL — SIGNIFICANT CHANGE UP (ref 0–0.2)
BASOPHILS NFR BLD AUTO: 0.5 % — SIGNIFICANT CHANGE UP (ref 0–2)
BILIRUB SERPL-MCNC: 0.6 MG/DL — SIGNIFICANT CHANGE UP (ref 0.2–1.2)
BUN SERPL-MCNC: 25 MG/DL — HIGH (ref 7–18)
CALCIUM SERPL-MCNC: 9.6 MG/DL — SIGNIFICANT CHANGE UP (ref 8.4–10.5)
CHLORIDE SERPL-SCNC: 101 MMOL/L — SIGNIFICANT CHANGE UP (ref 96–108)
CO2 SERPL-SCNC: 27 MMOL/L — SIGNIFICANT CHANGE UP (ref 22–31)
CREAT SERPL-MCNC: 1.22 MG/DL — SIGNIFICANT CHANGE UP (ref 0.5–1.3)
EGFR: 68 ML/MIN/1.73M2 — SIGNIFICANT CHANGE UP
EOSINOPHIL # BLD AUTO: 0.21 K/UL — SIGNIFICANT CHANGE UP (ref 0–0.5)
EOSINOPHIL NFR BLD AUTO: 3.2 % — SIGNIFICANT CHANGE UP (ref 0–6)
ERYTHROCYTE [SEDIMENTATION RATE] IN BLOOD: 66 MM/HR — HIGH (ref 0–20)
GLUCOSE SERPL-MCNC: 97 MG/DL — SIGNIFICANT CHANGE UP (ref 70–99)
HCT VFR BLD CALC: 40.6 % — SIGNIFICANT CHANGE UP (ref 39–50)
HGB BLD-MCNC: 13.7 G/DL — SIGNIFICANT CHANGE UP (ref 13–17)
IMM GRANULOCYTES NFR BLD AUTO: 0.2 % — SIGNIFICANT CHANGE UP (ref 0–0.9)
INR BLD: 1.11 RATIO — SIGNIFICANT CHANGE UP (ref 0.85–1.18)
LACTATE SERPL-SCNC: 1.1 MMOL/L — SIGNIFICANT CHANGE UP (ref 0.7–2)
LYMPHOCYTES # BLD AUTO: 2.34 K/UL — SIGNIFICANT CHANGE UP (ref 1–3.3)
LYMPHOCYTES # BLD AUTO: 35.5 % — SIGNIFICANT CHANGE UP (ref 13–44)
MCHC RBC-ENTMCNC: 31.6 PG — SIGNIFICANT CHANGE UP (ref 27–34)
MCHC RBC-ENTMCNC: 33.7 GM/DL — SIGNIFICANT CHANGE UP (ref 32–36)
MCV RBC AUTO: 93.8 FL — SIGNIFICANT CHANGE UP (ref 80–100)
MONOCYTES # BLD AUTO: 0.51 K/UL — SIGNIFICANT CHANGE UP (ref 0–0.9)
MONOCYTES NFR BLD AUTO: 7.7 % — SIGNIFICANT CHANGE UP (ref 2–14)
NEUTROPHILS # BLD AUTO: 3.49 K/UL — SIGNIFICANT CHANGE UP (ref 1.8–7.4)
NEUTROPHILS NFR BLD AUTO: 52.9 % — SIGNIFICANT CHANGE UP (ref 43–77)
NRBC # BLD: 0 /100 WBCS — SIGNIFICANT CHANGE UP (ref 0–0)
PLATELET # BLD AUTO: 270 K/UL — SIGNIFICANT CHANGE UP (ref 150–400)
POTASSIUM SERPL-MCNC: 4.2 MMOL/L — SIGNIFICANT CHANGE UP (ref 3.5–5.3)
POTASSIUM SERPL-SCNC: 4.2 MMOL/L — SIGNIFICANT CHANGE UP (ref 3.5–5.3)
PROT SERPL-MCNC: 7.9 G/DL — SIGNIFICANT CHANGE UP (ref 6–8.3)
PROTHROM AB SERPL-ACNC: 12.6 SEC — SIGNIFICANT CHANGE UP (ref 9.5–13)
RBC # BLD: 4.33 M/UL — SIGNIFICANT CHANGE UP (ref 4.2–5.8)
RBC # FLD: 12.4 % — SIGNIFICANT CHANGE UP (ref 10.3–14.5)
SODIUM SERPL-SCNC: 134 MMOL/L — LOW (ref 135–145)
WBC # BLD: 6.59 K/UL — SIGNIFICANT CHANGE UP (ref 3.8–10.5)
WBC # FLD AUTO: 6.59 K/UL — SIGNIFICANT CHANGE UP (ref 3.8–10.5)

## 2024-02-12 PROCEDURE — 99285 EMERGENCY DEPT VISIT HI MDM: CPT

## 2024-02-12 PROCEDURE — 99223 1ST HOSP IP/OBS HIGH 75: CPT | Mod: GC

## 2024-02-12 PROCEDURE — 73701 CT LOWER EXTREMITY W/DYE: CPT | Mod: 26,RT,MA

## 2024-02-12 RX ORDER — AMLODIPINE BESYLATE 2.5 MG/1
10 TABLET ORAL DAILY
Refills: 0 | Status: DISCONTINUED | OUTPATIENT
Start: 2024-02-12 | End: 2024-02-17

## 2024-02-12 RX ORDER — LOSARTAN POTASSIUM 100 MG/1
100 TABLET, FILM COATED ORAL DAILY
Refills: 0 | Status: DISCONTINUED | OUTPATIENT
Start: 2024-02-12 | End: 2024-02-17

## 2024-02-12 RX ORDER — SODIUM CHLORIDE 9 MG/ML
1000 INJECTION INTRAMUSCULAR; INTRAVENOUS; SUBCUTANEOUS ONCE
Refills: 0 | Status: COMPLETED | OUTPATIENT
Start: 2024-02-12 | End: 2024-02-12

## 2024-02-12 RX ORDER — ACETAMINOPHEN 500 MG
650 TABLET ORAL EVERY 6 HOURS
Refills: 0 | Status: DISCONTINUED | OUTPATIENT
Start: 2024-02-12 | End: 2024-02-17

## 2024-02-12 RX ORDER — VANCOMYCIN HCL 1 G
1000 VIAL (EA) INTRAVENOUS ONCE
Refills: 0 | Status: DISCONTINUED | OUTPATIENT
Start: 2024-02-12 | End: 2024-02-12

## 2024-02-12 RX ORDER — AMPICILLIN SODIUM AND SULBACTAM SODIUM 250; 125 MG/ML; MG/ML
3 INJECTION, POWDER, FOR SUSPENSION INTRAMUSCULAR; INTRAVENOUS EVERY 6 HOURS
Refills: 0 | Status: DISCONTINUED | OUTPATIENT
Start: 2024-02-12 | End: 2024-02-17

## 2024-02-12 RX ORDER — INSULIN LISPRO 100/ML
VIAL (ML) SUBCUTANEOUS
Refills: 0 | Status: DISCONTINUED | OUTPATIENT
Start: 2024-02-12 | End: 2024-02-17

## 2024-02-12 RX ORDER — VANCOMYCIN HCL 1 G
1250 VIAL (EA) INTRAVENOUS EVERY 12 HOURS
Refills: 0 | Status: DISCONTINUED | OUTPATIENT
Start: 2024-02-12 | End: 2024-02-13

## 2024-02-12 RX ORDER — CEFEPIME 1 G/1
1000 INJECTION, POWDER, FOR SOLUTION INTRAMUSCULAR; INTRAVENOUS ONCE
Refills: 0 | Status: COMPLETED | OUTPATIENT
Start: 2024-02-12 | End: 2024-02-12

## 2024-02-12 RX ORDER — ONDANSETRON 8 MG/1
4 TABLET, FILM COATED ORAL EVERY 8 HOURS
Refills: 0 | Status: DISCONTINUED | OUTPATIENT
Start: 2024-02-12 | End: 2024-02-17

## 2024-02-12 RX ORDER — ENOXAPARIN SODIUM 100 MG/ML
40 INJECTION SUBCUTANEOUS EVERY 24 HOURS
Refills: 0 | Status: DISCONTINUED | OUTPATIENT
Start: 2024-02-12 | End: 2024-02-17

## 2024-02-12 RX ORDER — ATORVASTATIN CALCIUM 80 MG/1
20 TABLET, FILM COATED ORAL AT BEDTIME
Refills: 0 | Status: DISCONTINUED | OUTPATIENT
Start: 2024-02-12 | End: 2024-02-17

## 2024-02-12 RX ORDER — GABAPENTIN 400 MG/1
100 CAPSULE ORAL THREE TIMES A DAY
Refills: 0 | Status: DISCONTINUED | OUTPATIENT
Start: 2024-02-12 | End: 2024-02-17

## 2024-02-12 RX ORDER — LANOLIN ALCOHOL/MO/W.PET/CERES
3 CREAM (GRAM) TOPICAL AT BEDTIME
Refills: 0 | Status: DISCONTINUED | OUTPATIENT
Start: 2024-02-12 | End: 2024-02-17

## 2024-02-12 RX ADMIN — CEFEPIME 100 MILLIGRAM(S): 1 INJECTION, POWDER, FOR SOLUTION INTRAMUSCULAR; INTRAVENOUS at 15:45

## 2024-02-12 NOTE — H&P ADULT - PROBLEM SELECTOR PLAN 1
- P/w right anterior thigh wound with purulent discharge.    - CT LE - Findings of cellulitis/phlegmon change about the anterolateral mid thigh tissues. No drainable fluid collection and no evidence of a deeper infection.  - ESR - 66.   - S/p cefepime and vancomycin in ED.   - Start unasyn and vancomycin.   - F/U CRP   - F/U BCx.   - F/U ANDIE (weak peripheral pulses)  - Consult ID - Dr. Gutierres in AM.   - Consult surgery for possible wound debridement. - P/w right anterior thigh wound with purulent discharge.    - CT LE - Findings of cellulitis/phlegmon change about the anterolateral mid thigh tissues. No drainable fluid collection and no evidence of a deeper infection.  - ESR - 66.   - S/p cefepime and vancomycin in ED.   - Start unasyn and vancomycin.   - F/U CRP   - F/U BCx.   - F/U ANDIE (weak peripheral pulses)  - Consulted ID - Dr. Gutierres.   - Consulted surgery - for possible wound debridement.

## 2024-02-12 NOTE — ED PROVIDER NOTE - TOBACCO USE
[email or web address]    
Former smoker

## 2024-02-12 NOTE — H&P ADULT - PROBLEM SELECTOR PLAN 2
- Patient takes metformin, glipizide, and Tradjenta at home.   - Hold oral hypoglycemics.   - Start SSI.   - F/U A1C.

## 2024-02-12 NOTE — ED ADULT NURSE NOTE - OBJECTIVE STATEMENT
pt endorsing right upper thigh diabetic wound that has been progressively getting worse. Pt states he has asked his doctor to switch his PO medication to insulin to prevent worsening of the wound. At baseline, pt is AAOx4, speaking in clear and complete sentences.

## 2024-02-12 NOTE — ED PROVIDER NOTE - CLINICAL SUMMARY MEDICAL DECISION MAKING FREE TEXT BOX
Nanci: 58 year old male with PMH DM, HTN, HLD presents with right thigh wound x 3 weeks. Patient reports atraumatic right anterior thigh wound over the past 3 weeks, worsening per pt. Denies any recent injury or trauma. Denies any fevers, chest pain, shortness of breath, numbness, or weakness. Patient seen by PCP today and advised to go to the Emergency Dept for further evaluation/management. Physical exam per above. Likely infectious, patient afebrile in NAD. No crepitus, no signs of necrotizing soft tissue infection. Will obtain labs, imaging, provide supportive treatment, likely admit.

## 2024-02-12 NOTE — H&P ADULT - HISTORY OF PRESENT ILLNESS
Patient is a 57 y/o M with PMH of DM, HTN, HLD, osteomyelitis s/p right ray amputation (7 months ago), alcohol use disorder who presented with right anterior thigh wound for 3 weeks. Patient denies any trauma to his thigh and reports that initially a hard slightly raised lesion formed on his thigh which turned into a blister which burst leaking purulent discharge. Patient reports that in the past 3 weeks the wound has worsened and grown in size with intermittent purulent drainage. Patient reports the wound and the surrounding area in tender to touch, and sometimes gets warm and erythematous. Patient denies any localized swelling. Patient reports that he saw his PCP for it but the PCP did not give him any antibiotics. Patient denies any associated fevers, chills, headache, lightheadedness, malaise, or weakness. Patient denies any recent chest pain, palpitations, shortness of breath, cough, nausea, vomiting, abdominal pain, diarrhea, constipation, melena, hematochezia, dysuria, urinary frequency, or urgency. Patient denies any other complains at this time.

## 2024-02-12 NOTE — ED PROVIDER NOTE - OBJECTIVE STATEMENT
58 year old male with PMH DM, HTN, HLD presents with right thigh wound x 3 weeks. Patient reports atraumatic right anterior thigh wound over the past 3 weeks, worsening per pt. Denies any recent injury or trauma. Denies any fevers, chest pain, shortness of breath, numbness, or weakness. Patient seen by PCP today and advised to go to the Emergency Dept for further evaluation/management. Denies any additional complaints.

## 2024-02-12 NOTE — H&P ADULT - NSHPPHYSICALEXAM_GEN_ALL_CORE
PHYSICAL EXAM:  GENERAL: NAD, speaks in full sentences, no signs of respiratory distress  HEAD:  Atraumatic, Normocephalic  EYES: EOMI, PERRLA, conjunctiva and sclera clear  NECK: Supple  CHEST/LUNG: Clear to auscultation bilaterally; No wheeze; No crackles; No accessory muscles used  HEART: Regular rate and rhythm; No murmurs;   ABDOMEN: Soft, Nontender, Nondistended; Bowel sounds present; No guarding  EXTREMITIES:  1+ Peripheral Pulses, No edema  PSYCH: AAOx3  NEUROLOGY: non-focal  SKIN: right anterior 3-5cm wound with granulation tissue, pustular discharge, with warmth of surrounding area. left anterior thigh raised pink lesion with central blister formation.

## 2024-02-12 NOTE — ED PROVIDER NOTE - INTERNATIONAL TRAVEL
Endometrial biopsy    Date/Time: 7/20/2022 10:16 AM  Performed by: Gisell Cifuentes MD  Authorized by: Gisell Cifuentes MD   Universal Protocol:  Consent: Verbal consent obtained  Written consent obtained  Risks and benefits: risks, benefits and alternatives were discussed  Consent given by: patient  Time out: Immediately prior to procedure a "time out" was called to verify the correct patient, procedure, equipment, support staff and site/side marked as required    Patient understanding: patient states understanding of the procedure being performed  Patient consent: the patient's understanding of the procedure matches consent given  Required items: required blood products, implants, devices, and special equipment available  Patient identity confirmed: verbally with patient      Indication:     Indications: Post-menopausal bleeding      Progression of post-menopausal bleeding:  Resolved  Procedure:     Procedure: endometrial biopsy with Pipelle      A bivalve speculum was placed in the vagina: yes      Cervix cleaned and prepped: yes      The cervix was dilated: no      Uterus sounded: yes      Uterus sound depth (cm):  8    Specimen collected: specimen collected and sent to pathology      Patient tolerated procedure well with no complications: yes    Findings:     Uterus size:  Non-gravid    Cervix: normal No

## 2024-02-12 NOTE — ED PROVIDER NOTE - PHYSICAL EXAMINATION
CONSTITUTIONAL: non-toxic, well appearing  SKIN: no rash, no petechiae.  EYES: PERRL, EOMI, pink conjunctiva, anicteric  ENT: tongue and uvular midline, no exudates, moist mucous membranes  NECK: Supple; no meningismus, no JVD  CARD: RRR, no murmurs, equal radial pulses bilaterally 2+  RESP: CTAB, no respiratory distress  ABD: Soft, non-tender, non-distended, no peritoneal signs, no CVA tenderness  EXT: Normal ROM x4. No edema. Right anterior thigh 1 cm area of purulent discharge with surrounding induration, tender, no crepitus  NEURO: Alert, oriented. Neuro exam nonfocal, equal strength bilaterally  PSYCH: Cooperative, appropriate.

## 2024-02-12 NOTE — ED ADULT NURSE NOTE - NSFALLUNIVINTERV_ED_ALL_ED
Bed/Stretcher in lowest position, wheels locked, appropriate side rails in place/Call bell, personal items and telephone in reach/Instruct patient to call for assistance before getting out of bed/chair/stretcher/Non-slip footwear applied when patient is off stretcher/[place] to call system/Physically safe environment - no spills, clutter or unnecessary equipment/Purposeful proactive rounding/Room/bathroom lighting operational, light cord in reach

## 2024-02-12 NOTE — H&P ADULT - ATTENDING COMMENTS
Patient seen and examined on 2/12/24 at approximately 7:30 pm. Case discussed with Dr. Toribio.  In brief, this is a 59 yo [place] M with DM2 (A1c-11.4), HTN, HLD, osteomyelitis s/p right ray amputation (7 months ago), alcohol use disorder who presented with right anterior thigh wound for 3 weeks. Wound is quite impressive. Patient reports that it started out as a pimple. Agree with continuing  broad spectrum antibiotics for now and recommend ID consult in the AM. Would also ask surgical team and wound care to evaluate given the extensiveness of the wound.. Consider endocrine consult in the AM given his A1c of 11.4.. Remaining care as noted above.

## 2024-02-12 NOTE — H&P ADULT - ASSESSMENT
Patient is a 59 y/o M with PMH of DM, HTN, HLD, osteomyelitis s/p right ray amputation (7 months ago), alcohol use disorder who presented with right anterior thigh wound for 3 weeks. Patient is being admitted for management of right anterior thigh infected wound and surrounding cellulitis.

## 2024-02-13 DIAGNOSIS — Z29.9 ENCOUNTER FOR PROPHYLACTIC MEASURES, UNSPECIFIED: ICD-10-CM

## 2024-02-13 DIAGNOSIS — L03.115 CELLULITIS OF RIGHT LOWER LIMB: ICD-10-CM

## 2024-02-13 DIAGNOSIS — I10 ESSENTIAL (PRIMARY) HYPERTENSION: ICD-10-CM

## 2024-02-13 DIAGNOSIS — T14.8XXA OTHER INJURY OF UNSPECIFIED BODY REGION, INITIAL ENCOUNTER: ICD-10-CM

## 2024-02-13 DIAGNOSIS — E78.5 HYPERLIPIDEMIA, UNSPECIFIED: ICD-10-CM

## 2024-02-13 DIAGNOSIS — E11.9 TYPE 2 DIABETES MELLITUS WITHOUT COMPLICATIONS: ICD-10-CM

## 2024-02-13 LAB
A1C WITH ESTIMATED AVERAGE GLUCOSE RESULT: 11.4 % — HIGH (ref 4–5.6)
ANION GAP SERPL CALC-SCNC: 8 MMOL/L — SIGNIFICANT CHANGE UP (ref 5–17)
BUN SERPL-MCNC: 21 MG/DL — HIGH (ref 7–18)
CALCIUM SERPL-MCNC: 9.2 MG/DL — SIGNIFICANT CHANGE UP (ref 8.4–10.5)
CHLORIDE SERPL-SCNC: 101 MMOL/L — SIGNIFICANT CHANGE UP (ref 96–108)
CO2 SERPL-SCNC: 27 MMOL/L — SIGNIFICANT CHANGE UP (ref 22–31)
CREAT SERPL-MCNC: 1.28 MG/DL — SIGNIFICANT CHANGE UP (ref 0.5–1.3)
CRP SERPL-MCNC: 3 MG/L — SIGNIFICANT CHANGE UP
EGFR: 64 ML/MIN/1.73M2 — SIGNIFICANT CHANGE UP
ESTIMATED AVERAGE GLUCOSE: 280 MG/DL — HIGH (ref 68–114)
GLUCOSE BLDC GLUCOMTR-MCNC: 154 MG/DL — HIGH (ref 70–99)
GLUCOSE BLDC GLUCOMTR-MCNC: 186 MG/DL — HIGH (ref 70–99)
GLUCOSE BLDC GLUCOMTR-MCNC: 256 MG/DL — HIGH (ref 70–99)
GLUCOSE SERPL-MCNC: 220 MG/DL — HIGH (ref 70–99)
HCT VFR BLD CALC: 39.5 % — SIGNIFICANT CHANGE UP (ref 39–50)
HCV AB S/CO SERPL IA: 0.18 S/CO — SIGNIFICANT CHANGE UP (ref 0–0.99)
HCV AB SERPL-IMP: SIGNIFICANT CHANGE UP
HGB BLD-MCNC: 13.1 G/DL — SIGNIFICANT CHANGE UP (ref 13–17)
MAGNESIUM SERPL-MCNC: 1.7 MG/DL — SIGNIFICANT CHANGE UP (ref 1.6–2.6)
MCHC RBC-ENTMCNC: 31.6 PG — SIGNIFICANT CHANGE UP (ref 27–34)
MCHC RBC-ENTMCNC: 33.2 GM/DL — SIGNIFICANT CHANGE UP (ref 32–36)
MCV RBC AUTO: 95.4 FL — SIGNIFICANT CHANGE UP (ref 80–100)
NRBC # BLD: 0 /100 WBCS — SIGNIFICANT CHANGE UP (ref 0–0)
PHOSPHATE SERPL-MCNC: 3.9 MG/DL — SIGNIFICANT CHANGE UP (ref 2.5–4.5)
PLATELET # BLD AUTO: 250 K/UL — SIGNIFICANT CHANGE UP (ref 150–400)
POTASSIUM SERPL-MCNC: 4 MMOL/L — SIGNIFICANT CHANGE UP (ref 3.5–5.3)
POTASSIUM SERPL-SCNC: 4 MMOL/L — SIGNIFICANT CHANGE UP (ref 3.5–5.3)
RBC # BLD: 4.14 M/UL — LOW (ref 4.2–5.8)
RBC # FLD: 12.4 % — SIGNIFICANT CHANGE UP (ref 10.3–14.5)
SODIUM SERPL-SCNC: 136 MMOL/L — SIGNIFICANT CHANGE UP (ref 135–145)
WBC # BLD: 6.26 K/UL — SIGNIFICANT CHANGE UP (ref 3.8–10.5)
WBC # FLD AUTO: 6.26 K/UL — SIGNIFICANT CHANGE UP (ref 3.8–10.5)

## 2024-02-13 PROCEDURE — 99253 IP/OBS CNSLTJ NEW/EST LOW 45: CPT | Mod: 57

## 2024-02-13 PROCEDURE — 99254 IP/OBS CNSLTJ NEW/EST MOD 60: CPT

## 2024-02-13 PROCEDURE — 99221 1ST HOSP IP/OBS SF/LOW 40: CPT | Mod: 57

## 2024-02-13 PROCEDURE — 93923 UPR/LXTR ART STDY 3+ LVLS: CPT | Mod: 26

## 2024-02-13 RX ADMIN — ENOXAPARIN SODIUM 40 MILLIGRAM(S): 100 INJECTION SUBCUTANEOUS at 00:31

## 2024-02-13 RX ADMIN — LOSARTAN POTASSIUM 100 MILLIGRAM(S): 100 TABLET, FILM COATED ORAL at 10:37

## 2024-02-13 RX ADMIN — AMPICILLIN SODIUM AND SULBACTAM SODIUM 200 GRAM(S): 250; 125 INJECTION, POWDER, FOR SUSPENSION INTRAMUSCULAR; INTRAVENOUS at 00:14

## 2024-02-13 RX ADMIN — Medication 3: at 22:13

## 2024-02-13 RX ADMIN — GABAPENTIN 100 MILLIGRAM(S): 400 CAPSULE ORAL at 22:13

## 2024-02-13 RX ADMIN — AMLODIPINE BESYLATE 10 MILLIGRAM(S): 2.5 TABLET ORAL at 10:35

## 2024-02-13 RX ADMIN — SODIUM CHLORIDE 1000 MILLILITER(S): 9 INJECTION INTRAMUSCULAR; INTRAVENOUS; SUBCUTANEOUS at 00:07

## 2024-02-13 RX ADMIN — AMPICILLIN SODIUM AND SULBACTAM SODIUM 200 GRAM(S): 250; 125 INJECTION, POWDER, FOR SUSPENSION INTRAMUSCULAR; INTRAVENOUS at 10:45

## 2024-02-13 RX ADMIN — Medication 1: at 17:16

## 2024-02-13 RX ADMIN — GABAPENTIN 100 MILLIGRAM(S): 400 CAPSULE ORAL at 10:35

## 2024-02-13 RX ADMIN — AMPICILLIN SODIUM AND SULBACTAM SODIUM 200 GRAM(S): 250; 125 INJECTION, POWDER, FOR SUSPENSION INTRAMUSCULAR; INTRAVENOUS at 14:58

## 2024-02-13 RX ADMIN — GABAPENTIN 100 MILLIGRAM(S): 400 CAPSULE ORAL at 15:03

## 2024-02-13 RX ADMIN — ATORVASTATIN CALCIUM 20 MILLIGRAM(S): 80 TABLET, FILM COATED ORAL at 22:13

## 2024-02-13 NOTE — PROGRESS NOTE ADULT - PROBLEM SELECTOR PLAN 1
- P/w right anterior thigh wound with purulent discharge.    - CT LE - Findings of cellulitis/phlegmon change about the anterolateral mid thigh tissues. No drainable fluid collection and no evidence of a deeper infection.  - ESR - 66.   - S/p cefepime and vancomycin in ED.   - Start unasyn and vancomycin.   - F/U CRP   - F/U BCx.   - F/U ANDIE (weak peripheral pulses)  - Consulted ID - Dr. Guteirres.   - Consulted surgery - for possible wound debridement. - P/w right anterior thigh wound with purulent discharge.  S/p cefepime and vancomycin in ED.   - CT LE - Findings of cellulitis/phlegmon change about the anterolateral mid thigh tissues. No drainable fluid collection and no evidence of a deeper infection.  - Start unasyn and vancomycin.   - F/U CRP   - F/U BCx.   - F/U ANDIE (weak peripheral pulses)  - Consulted ID - Dr. Gutierres.   - Consulted surgery - for possible wound debridement at the bedside

## 2024-02-13 NOTE — PROGRESS NOTE ADULT - ASSESSMENT
Patient is a 57 y/o M with PMH of DM, HTN, HLD, osteomyelitis s/p right ray amputation (7 months ago), alcohol use disorder who presented with right anterior thigh wound for 3 weeks. Patient is being admitted for management of right anterior thigh infected wound and surrounding cellulitis.

## 2024-02-13 NOTE — CONSULT NOTE ADULT - SUBJECTIVE AND OBJECTIVE BOX
GENERAL SURGERY CONSULT   59 y/o male with past medical history of DM, HTN, HLD presents to the ED with R thigh wound x 3 weeks and past surgical history of toe amputation. General surgery consulted for possible debridement. Pt seen and examined at bedside. Pt admits to small pimple forming on R thigh 3 weeks ago that continued to worsen and now has purulent drainage. Pt followed up with PCP and continued local wound care without improvement which prompted ED visit. Pt states his blood sugar has not been well controlled, which he has been attempting to manage with outpatient provider. Pt states he has not had a wound like this in the past. States he was not prescribed oral antibiotics by outpatient provider. Pt ate today. Pt does not take anticoagulation. Denies fever, chills at home.     PAST MEDICAL & SURGICAL HISTORY:  Alcohol abuse  Diabetes mellitus  Hypertension  Hyperlipidemia  No significant past surgical history          MEDICATIONS  (STANDING):  amLODIPine   Tablet 10 milliGRAM(s) Oral daily  ampicillin/sulbactam  IVPB 3 Gram(s) IV Intermittent every 6 hours  atorvastatin 20 milliGRAM(s) Oral at bedtime  enoxaparin Injectable 40 milliGRAM(s) SubCutaneous every 24 hours  gabapentin 100 milliGRAM(s) Oral three times a day  insulin lispro (ADMELOG) corrective regimen sliding scale   SubCutaneous Before meals and at bedtime  losartan 100 milliGRAM(s) Oral daily    MEDICATIONS  (PRN):  acetaminophen     Tablet .. 650 milliGRAM(s) Oral every 6 hours PRN Temp greater or equal to 38C (100.4F), Mild Pain (1 - 3)  melatonin 3 milliGRAM(s) Oral at bedtime PRN Insomnia  ondansetron Injectable 4 milliGRAM(s) IV Push every 8 hours PRN Nausea and/or Vomiting      Allergies    No Known Allergies    Intolerances        Vital Signs Last 24 Hrs  T(C): 36.8 (13 Feb 2024 07:59), Max: 36.8 (13 Feb 2024 07:59)  T(F): 98.2 (13 Feb 2024 07:59), Max: 98.2 (13 Feb 2024 07:59)  HR: 62 (13 Feb 2024 07:59) (62 - 86)  BP: 124/79 (13 Feb 2024 07:59) (104/68 - 124/79)  BP(mean): --  RR: 16 (13 Feb 2024 07:59) (16 - 18)  SpO2: 95% (13 Feb 2024 07:59) (95% - 98%)    Parameters below as of 13 Feb 2024 07:59  Patient On (Oxygen Delivery Method): room air        Physical:  Gen: A&Ox3. NAD  Resp: Unlabored breathing. equal chest rise bilaterally.   Abd: Soft ND, NT to palpation.    Extremities: R chronic thigh wound. around 3c, by 3cm with slough, small area of eschar superiorlateral skin edge. Purulent drainage expressed from wound. Surrounding induration palpated w/ skin peeling noted. Nonerythematous. No crepitus. Medially, small area of induration draining minimally. Nontender. thigh soft. surrounding skin no changes.        I&O's Detail      LABS:                        13.1   6.26  )-----------( 250      ( 13 Feb 2024 05:20 )             39.5              02-13    136  |  101  |  21<H>  ----------------------------<  220<H>  4.0   |  27  |  1.28    Ca    9.2      13 Feb 2024 05:20  Phos  3.9     02-13  Mg     1.7     02-13    TPro  7.9  /  Alb  3.1<L>  /  TBili  0.6  /  DBili  x   /  AST  18  /  ALT  26  /  AlkPhos  59  02-12            PT/INR - ( 12 Feb 2024 15:20 )   PT: 12.6 sec;   INR: 1.11 ratio         PTT - ( 12 Feb 2024 15:20 )  PTT:35.8 sec  Urinalysis Basic - ( 13 Feb 2024 05:20 )    Color: x / Appearance: x / SG: x / pH: x  Gluc: 220 mg/dL / Ketone: x  / Bili: x / Urobili: x   Blood: x / Protein: x / Nitrite: x   Leuk Esterase: x / RBC: x / WBC x   Sq Epi: x / Non Sq Epi: x / Bacteria: x        RADIOLOGY & ADDITIONAL STUDIES:

## 2024-02-13 NOTE — PROGRESS NOTE ADULT - SUBJECTIVE AND OBJECTIVE BOX
Patient is a 58y old  Male who presents with a chief complaint of Right anterior thigh wound. (13 Feb 2024 12:03)      INTERVAL HPI/OVERNIGHT EVENTS: no new complaints    MEDICATIONS  (STANDING):  amLODIPine   Tablet 10 milliGRAM(s) Oral daily  ampicillin/sulbactam  IVPB 3 Gram(s) IV Intermittent every 6 hours  atorvastatin 20 milliGRAM(s) Oral at bedtime  enoxaparin Injectable 40 milliGRAM(s) SubCutaneous every 24 hours  gabapentin 100 milliGRAM(s) Oral three times a day  insulin lispro (ADMELOG) corrective regimen sliding scale   SubCutaneous Before meals and at bedtime  losartan 100 milliGRAM(s) Oral daily    MEDICATIONS  (PRN):  acetaminophen     Tablet .. 650 milliGRAM(s) Oral every 6 hours PRN Temp greater or equal to 38C (100.4F), Mild Pain (1 - 3)  melatonin 3 milliGRAM(s) Oral at bedtime PRN Insomnia  ondansetron Injectable 4 milliGRAM(s) IV Push every 8 hours PRN Nausea and/or Vomiting      __________________________________________________  REVIEW OF SYSTEMS:    CONSTITUTIONAL: No fever,   EYES: no acute visual disturbances  NECK: No pain or stiffness  RESPIRATORY: No cough; No shortness of breath  CARDIOVASCULAR: No chest pain, no palpitations  GASTROINTESTINAL: No pain. No nausea or vomiting; No diarrhea   NEUROLOGICAL: No headache or numbness, no tremors  MUSCULOSKELETAL: No joint pain, no muscle pain  GENITOURINARY: no dysuria, no frequency, no hesitancy  PSYCHIATRY: no depression , no anxiety  ALL OTHER  ROS negative        Vital Signs Last 24 Hrs  T(C): 36.7 (13 Feb 2024 19:39), Max: 36.8 (13 Feb 2024 07:59)  T(F): 98 (13 Feb 2024 19:39), Max: 98.2 (13 Feb 2024 07:59)  HR: 71 (13 Feb 2024 19:39) (62 - 86)  BP: 124/75 (13 Feb 2024 19:39) (104/68 - 124/79)  BP(mean): --  RR: 18 (13 Feb 2024 19:39) (16 - 18)  SpO2: 99% (13 Feb 2024 19:39) (95% - 99%)    Parameters below as of 13 Feb 2024 19:39  Patient On (Oxygen Delivery Method): room air        ________________________________________________  PHYSICAL EXAM:  GENERAL: NAD  HEENT: Normocephalic;  conjunctivae and sclerae clear; moist mucous membranes;   NECK : supple  CHEST/LUNG: Clear to auscultation bilaterally with good air entry   HEART: S1 S2  regular; no murmurs, gallops or rubs  ABDOMEN: Soft, Nontender, Nondistended; Bowel sounds present  EXTREMITIES: no cyanosis; no edema; no calf tenderness  SKIN: warm and dry; no rash  NERVOUS SYSTEM:  Awake and alert; Oriented  to place, person and time ; no new deficits    _________________________________________________  LABS:                        13.1   6.26  )-----------( 250      ( 13 Feb 2024 05:20 )             39.5     02-13    136  |  101  |  21<H>  ----------------------------<  220<H>  4.0   |  27  |  1.28    Ca    9.2      13 Feb 2024 05:20  Phos  3.9     02-13  Mg     1.7     02-13    TPro  7.9  /  Alb  3.1<L>  /  TBili  0.6  /  DBili  x   /  AST  18  /  ALT  26  /  AlkPhos  59  02-12    PT/INR - ( 12 Feb 2024 15:20 )   PT: 12.6 sec;   INR: 1.11 ratio         PTT - ( 12 Feb 2024 15:20 )  PTT:35.8 sec  Urinalysis Basic - ( 13 Feb 2024 05:20 )    Color: x / Appearance: x / SG: x / pH: x  Gluc: 220 mg/dL / Ketone: x  / Bili: x / Urobili: x   Blood: x / Protein: x / Nitrite: x   Leuk Esterase: x / RBC: x / WBC x   Sq Epi: x / Non Sq Epi: x / Bacteria: x      CAPILLARY BLOOD GLUCOSE      POCT Blood Glucose.: 186 mg/dL (13 Feb 2024 16:33)  POCT Blood Glucose.: 154 mg/dL (13 Feb 2024 08:06)        RADIOLOGY & ADDITIONAL TESTS:    Imaging Personally Reviewed:  YES/NO    Consultant(s) Notes Reviewed:   YES/ No    Care Discussed with Consultants :     Plan of care was discussed with patient and /or primary care giver; all questions and concerns were addressed and care was aligned with patient's wishes.       Patient is a 58y old  Male who presents with a chief complaint of Right anterior thigh wound. (13 Feb 2024 12:03)      INTERVAL HPI/OVERNIGHT EVENTS: no new complaints.  endorses pain with manipulation of right thigh wound, + fatigue    MEDICATIONS  (STANDING):  amLODIPine   Tablet 10 milliGRAM(s) Oral daily  ampicillin/sulbactam  IVPB 3 Gram(s) IV Intermittent every 6 hours  atorvastatin 20 milliGRAM(s) Oral at bedtime  enoxaparin Injectable 40 milliGRAM(s) SubCutaneous every 24 hours  gabapentin 100 milliGRAM(s) Oral three times a day  insulin lispro (ADMELOG) corrective regimen sliding scale   SubCutaneous Before meals and at bedtime  losartan 100 milliGRAM(s) Oral daily    MEDICATIONS  (PRN):  acetaminophen     Tablet .. 650 milliGRAM(s) Oral every 6 hours PRN Temp greater or equal to 38C (100.4F), Mild Pain (1 - 3)  melatonin 3 milliGRAM(s) Oral at bedtime PRN Insomnia  ondansetron Injectable 4 milliGRAM(s) IV Push every 8 hours PRN Nausea and/or Vomiting      __________________________________________________  REVIEW OF SYSTEMS:    CONSTITUTIONAL: No fever,   EYES: no acute visual disturbances  NECK: No pain or stiffness  RESPIRATORY: No cough; No shortness of breath  CARDIOVASCULAR: No chest pain, no palpitations  GASTROINTESTINAL: No pain. No nausea or vomiting; No diarrhea   NEUROLOGICAL: No headache or numbness, no tremors  MUSCULOSKELETAL: No joint pain, no muscle pain  GENITOURINARY: no dysuria, no frequency, no hesitancy  PSYCHIATRY: no depression , no anxiety  ALL OTHER  ROS negative        Vital Signs Last 24 Hrs  T(C): 36.7 (13 Feb 2024 19:39), Max: 36.8 (13 Feb 2024 07:59)  T(F): 98 (13 Feb 2024 19:39), Max: 98.2 (13 Feb 2024 07:59)  HR: 71 (13 Feb 2024 19:39) (62 - 86)  BP: 124/75 (13 Feb 2024 19:39) (104/68 - 124/79)  BP(mean): --  RR: 18 (13 Feb 2024 19:39) (16 - 18)  SpO2: 99% (13 Feb 2024 19:39) (95% - 99%)    Parameters below as of 13 Feb 2024 19:39  Patient On (Oxygen Delivery Method): room air        ________________________________________________  PHYSICAL EXAM:  GENERAL: NAD  HEENT: Normocephalic;  conjunctivae and sclerae clear; moist mucous membranes;   NECK : supple  CHEST/LUNG: Clear to auscultation bilaterally with good air entry   HEART: S1 S2  regular; no murmurs, gallops or rubs  ABDOMEN: Soft, Nontender, Nondistended; Bowel sounds present  EXTREMITIES: 3cm x 3cm  right thigh wound with fibrinous tissue and purulent fluid  SKIN: warm and dry; no rash  NERVOUS SYSTEM:  Awake and alert; Oriented  to place, person and time ; no new deficits    _________________________________________________  LABS:                        13.1   6.26  )-----------( 250      ( 13 Feb 2024 05:20 )             39.5     02-13    136  |  101  |  21<H>  ----------------------------<  220<H>  4.0   |  27  |  1.28    Ca    9.2      13 Feb 2024 05:20  Phos  3.9     02-13  Mg     1.7     02-13    TPro  7.9  /  Alb  3.1<L>  /  TBili  0.6  /  DBili  x   /  AST  18  /  ALT  26  /  AlkPhos  59  02-12    PT/INR - ( 12 Feb 2024 15:20 )   PT: 12.6 sec;   INR: 1.11 ratio         PTT - ( 12 Feb 2024 15:20 )  PTT:35.8 sec  Urinalysis Basic - ( 13 Feb 2024 05:20 )    Color: x / Appearance: x / SG: x / pH: x  Gluc: 220 mg/dL / Ketone: x  / Bili: x / Urobili: x   Blood: x / Protein: x / Nitrite: x   Leuk Esterase: x / RBC: x / WBC x   Sq Epi: x / Non Sq Epi: x / Bacteria: x      CAPILLARY BLOOD GLUCOSE      POCT Blood Glucose.: 186 mg/dL (13 Feb 2024 16:33)  POCT Blood Glucose.: 154 mg/dL (13 Feb 2024 08:06)        RADIOLOGY & ADDITIONAL TESTS:    Imaging Personally Reviewed:  YES/NO    Consultant(s) Notes Reviewed:   YES/ No    Care Discussed with Consultants :     Plan of care was discussed with patient and /or primary care giver; all questions and concerns were addressed and care was aligned with patient's wishes.

## 2024-02-13 NOTE — ED ADULT NURSE REASSESSMENT NOTE - SYMPTOMS
2021      RE: Dominga Bee  [address]  [place] [address]       Pediatric Hematology/Oncology Clinic Note    CC: Zakiya is a 1 y/o F who presents with her mother and brother for follow up for hereditary spherocytosis.    INTERVAL HISTORY:   Dominga was last seen in hematology clinic for her initial consultation for HS on 20. In the last 18 months she has been great. No acute illnesses this winter. Denies pallor, jaundice, fatigue, or abdominal pain. No emergency visits. No fevers or illness. She is very active and thriving. No parental concerns today.     HEMATOLOGIC HISTORY  Dominga was diagnosed with hereditary spherocytosis by the osmotic fragility test on 1/3/2020 at 1 years old. She was evaluated by her PCP due to maternal history of hereditary spherocytosis. Dominga's mother was diagnosed with HS 2 years previous when she developed jaundice with scleral icterus after an acute illness as an adult. She had a longstanding history of anemia and gallstones requiring removal of her gallbladder. She never required any blood transfusions and she has not had a splenectomy. No other known family members with HS. Dominga had  jaundice requiring phototherapy in the [facility] for 5 days. She has otherwise been healthy. No further episodes of jaundice even with viral illnesses.     REVIEW OF SYSTEMS: A comprehensive review of systems was performed and was negative unless noted in the HPI.    PAST MEDICAL HISTORY  Past Medical History:   Diagnosis Date     Eczema       jaundice      RSV bronchiolitis 2019      PAST SURGICAL HISTORY  No past surgical history on file.  Cyst on her nose bridge removed at about 6 months old.     FAMILY HISTORY  Family History   Problem Relation Age of Onset     Hereditary Spherocytosis Mother      Cholelithiasis Mother      Hereditary Spherocytosis Brother      No family hx of splenectomy, liver problems, or nutritional deficiencies. No family  "history of bleeding or clotting problems.     SOCIAL HISTORY  Social History     Social History Narrative    Lives with mother, father, half-sister, and baby brother.       MEDICATIONS  ketoconazole (NIZORAL) 2 % external cream,     No current facility-administered medications on file prior to visit.   nope    Physical Exam:   /68 (BP Location: Right arm, Patient Position: Fowlers, Cuff Size: Child)   Pulse 103   Temp 98.1  F (36.7  C) (Axillary)   Resp 28   Ht 1.09 m (3' 6.91\")   Wt 17.6 kg (38 lb 12.8 oz)   SpO2 99%   BMI 14.81 kg/m  ,   38 lbs 12.82 oz  General: alert, interactive, well-appearing, NAD  HEENT: normocephalic, atraumatic, PERRLA, anicteric sclera, EOMI, nares patent, no oral lesions, normal oropharynx  Lymph Nodes: no significant cervical, supraclavicular, axillary, or inguinal adenopathy  Heart: RRR, no murmurs, rubs, or gallops, normal S1 and S2. Cap refill < 2sec  Lungs: CTAB, no wheezes, ronchi, or crackles, non labored breaths  Abdomen: soft, nontender, nondistended, BS present, no hepatosplenomegaly  : deferred  Neuro: A&O, CN 2-12 grossly intact, no focal defecits, normal gait  Extremities: symmetric, no edema, no erythema  Skin: no petechaie or bruises, no jaundice, no pallor  Psych: appropriate mood and affect    LABS:   Results for orders placed or performed in visit on 06/18/21 (from the past 24 hour(s))   Hepatic panel   Result Value Ref Range    Bilirubin Direct 0.2 0.0 - 0.2 mg/dL    Bilirubin Total 0.8 0.2 - 1.3 mg/dL    Albumin 4.2 3.4 - 5.0 g/dL    Protein Total 7.1 (H) 5.5 - 7.0 g/dL    Alkaline Phosphatase 148 110 - 320 U/L    ALT 21 0 - 50 U/L    AST 27 0 - 50 U/L   Basic metabolic panel   Result Value Ref Range    Sodium 139 133 - 143 mmol/L    Potassium 4.1 3.4 - 5.3 mmol/L    Chloride 106 96 - 110 mmol/L    Carbon Dioxide 25 20 - 32 mmol/L    Anion Gap 8 3 - 14 mmol/L    Glucose 69 (L) 70 - 99 mg/dL    Urea Nitrogen 11 9 - 22 mg/dL    Creatinine 0.40 0.15 - 0.53 " mg/dL    GFR Estimate GFR not calculated, patient <18 years old. >60 mL/min/[1.73_m2]    GFR Estimate If Black GFR not calculated, patient <18 years old. >60 mL/min/[1.73_m2]    Calcium 9.7 8.5 - 10.1 mg/dL   Lactate Dehydrogenase   Result Value Ref Range    Lactate Dehydrogenase 246 0 - 337 U/L   Reticulocyte count   Result Value Ref Range    % Retic 3.1 (H) 0.5 - 2.0 %    Absolute Retic 140.9 (H) 25 - 95 10e9/L   CBC with platelets differential   Result Value Ref Range    WBC 6.2 5.5 - 15.5 10e9/L    RBC Count 4.62 3.7 - 5.3 10e12/L    Hemoglobin 14.1 (H) 10.5 - 14.0 g/dL    Hematocrit 38.0 31.5 - 43.0 %    MCV 82 70 - 100 fl    MCH 30.5 26.5 - 33.0 pg    MCHC 37.1 (H) 31.5 - 36.5 g/dL    RDW 14.0 10.0 - 15.0 %    Platelet Count 259 150 - 450 10e9/L    Diff Method Automated Method     % Neutrophils 38.2 %    % Lymphocytes 52.2 %    % Monocytes 7.0 %    % Eosinophils 2.1 %    % Basophils 0.3 %    % Immature Granulocytes 0.2 %    Nucleated RBCs 0 0 /100    Absolute Neutrophil 2.4 0.8 - 7.7 10e9/L    Absolute Lymphocytes 3.2 2.3 - 13.3 10e9/L    Absolute Monocytes 0.4 0.0 - 1.1 10e9/L    Absolute Eosinophils 0.1 0.0 - 0.7 10e9/L    Absolute Basophils 0.0 0.0 - 0.2 10e9/L    Abs Immature Granulocytes 0.0 0 - 0.8 10e9/L    Absolute Nucleated RBC 0.0        ASSESSMENT  Dominga is a 1 year old female with hereditary spherocytosis (HS) and a hx of  jaundice. Clinically she is doing very well. There is no lab evidence of hemolysis or anemia on her labs today.    Hereditary spherocytosis (HS) is an genetic defect of a protein in the red blood cell membrane resulting in a loss of structural proteins, a decrease in RBC cell surface area, creating more progressively spherical cells. The affected red blood cells are more susceptible to hemolysis putting patients with HS at higher risk for hemolytic anemia when ill. This can by triggered by a viral or bacterial illness or any major stressor on his body. Signs of acute  hemolysis include pallor, fatigue, yellowing of the skin or eyes, acute weakness and fatigue, enlarging spleen, cola colored urine, or black or red stools. Persons with HS are at risk for red cell aplasia in the presence of an infection with Parvovirus B19. Patients with HS are also at risk for developing gallstones which can present with acute right-sided abdominal pain. Patients are advised to immediately seek medical attention at the earliest sign of any of these symptoms. Persons with HS who develop nutritional deficiencies such as iron, folate, or B12 are at risk for worsening anemia.        PLAN  - Education provided, as above  - Seek medical attention immediately for signs of hemolysis including acute fatigue, pallor, enlarging spleen, yellowing of the skin and eyes, or dark cola-colored urine. Contact information provided.   - RTC in 1 year for annual follow up with Dr. Miriam Langston.  Will obtain labs (CBC, BMP, LFTs, LDH, retic) at next visit.     Patient was seen and discussed with attending, Dr. Langston.     Shanice Hale DO  Pediatric Heme/Onc & BMT Fellow  Pager: [phone]    I saw and evaluated the patient and agree with the fellow's assessment and plan. I have personally reviewed all vital signs and laboratory studies performed in the last 24 hours.  Miriam Langston MD, MPH, MS    [facility]  Division of Pediatric Hematology/Oncology     Total time spent on the following services on the date of the encounter:  Preparing to see patient, chart review, review of outside records, Interpretation of labs, imaging and other tests, Performing a medically appropriate examination , Counseling and educating the patient/family/caregiver , Documenting clinical information in the electronic or other health record , Communicating results to the patient/family/caregiver , Care coordination  and Total time spent: 40          Shanice Hale MD   none

## 2024-02-13 NOTE — CONSULT NOTE ADULT - ASSESSMENT
HPI:  Patient is a 59 y/o M with PMH of DM, HTN, HLD, osteomyelitis s/p right ray amputation (7 months ago), alcohol use disorder who presented with right anterior thigh wound for 3 weeks. Patient denies any trauma to his thigh and reports that initially a hard slightly raised lesion formed on his thigh which turned into a blister which burst leaking purulent discharge. Patient reports that in the past 3 weeks the wound has worsened and grown in size with intermittent purulent drainage. Patient reports the wound and the surrounding area in tender to touch, and sometimes gets warm and erythematous. Patient denies any localized swelling. Patient reports that he saw his PCP for it but the PCP did not give him any antibiotics. Patient denies any associated fevers, chills, headache, lightheadedness, malaise, or weakness. Patient denies any recent chest pain, palpitations, shortness of breath, cough, nausea, vomiting, abdominal pain, diarrhea, constipation, melena, hematochezia, dysuria, urinary frequency, or urgency. Patient denies any other complains at this time.   (12 Feb 2024 22:11)    Allergies: No Known Allergies    PAST MEDICAL & SURGICAL HISTORY:  Alcohol abuse  Diabetes mellitus  Hypertension  Hyperlipidemia  No significant past surgical history    SOCIAL HISTORY: No smoking ;+ alcohol abuse, no IVDU  FAMILY HISTORY: no pertinent related medical history    REVIEW OF SYSTEMS:  CONSTITUTIONAL:  No fevers or chills, good appetite, good general state  EYES/ENT:  No visual changes;  No vertigo or throat pain   NECK:  No neck pain or stiffness  RESPIRATORY:  No cough, wheezing, hemoptysis; No shortness of breath  CARDIOVASCULAR:  No chest pain or palpitations  GASTROINTESTINAL:  No abdominal pain. No nausea, vomiting, or hematemesis; No diarrhea or constipation. No melena or hematochezia.  GENITOURINARY:  No dysuria, frequency or hematuria  NEUROLOGICAL:  No HA, no numbness or LE weakness  MSK: no back pain, no joint pain, R lat thigh ulcer  SKIN:  No itching, no skin rash    PHYSICAL EXAM:  VITALS: Vital Signs Last 24 Hrs  T(C): 36.8 (13 Feb 2024 07:59), Max: 36.8 (13 Feb 2024 07:59)  T(F): 98.2 (13 Feb 2024 07:59), Max: 98.2 (13 Feb 2024 07:59)  HR: 62 (13 Feb 2024 07:59) (62 - 86)  BP: 124/79 (13 Feb 2024 07:59) (104/68 - 124/79)  RR: 16 (13 Feb 2024 07:59) (16 - 18)  SpO2: 95% (13 Feb 2024 07:59) (95% - 98%)    Parameters below as of 13 Feb 2024 07:59  Patient On (Oxygen Delivery Method): room air    Gen: AOx3, NAD, non-toxic, pleasant  HEAD:  Atraumatic, Normocephalic  EYES: PERRLA, conjunctiva and sclera clear  ENT: Moist mucous membranes  NECK: Supple, No JVD  CV: S1+S2 normal, no murmurs   Resp: Clear bilat, no resp distress, no crackles/wheezes  Abd: Soft, nontender, +BS  Ext: No LE edema, no cyanosis, LE pulses present  : no dysuria)  IV/Skin: No thrombophlebitis, no skin rash  R lat thigh ulcer ~3.5-4 cm with yellow fibrine and mild purulent secretion, mild periwound edema   Msk: No low back pain  Neuro: AAOx3. No focal signs    LABS/DIAGNOSTIC TESTS:                        13.1   6.26  )-----------( 250      ( 13 Feb 2024 05:20 )             39.5     WBC Count: 6.26 K/uL (02-13 @ 05:20)  WBC Count: 6.59 K/uL (02-12 @ 15:20)    02-13    136  |  101  |  21<H>  ----------------------------<  220<H>  4.0   |  27  |  1.28    Ca    9.2      13 Feb 2024 05:20  Phos  3.9     02-13  Mg     1.7     02-13    TPro  7.9  /  Alb  3.1<L>  /  TBili  0.6  /  DBili  x   /  AST  18  /  ALT  26  /  AlkPhos  59  02-12    LACTATE:Lactate, Blood: 1.1 mmol/L (02-12 @ 15:20)    CULTURES: N/A    RADIOLOGY: < from: CT Lower Extremity w/ IV Cont, Right (02.12.24 @ 18:06) >  FINDINGS:  There is thickening of the skin with abnormal nodularity/reticulation of   the subcutaneous fat at the anterior/lateral margin of the right mid   thigh. No drainable fluid collection is seen. This area of inflammatory   change/phlegmon extends to the underlyingvastus lateralis muscle.   However, no abnormal enhancement or collection is seen within this   muscle. There is no osseous destruction or periosteal reaction. No acute   fracture or dislocation. Right hip joint space is maintained. There is an   os acetabuli. Pubic symphysis is intact. No hip effusion. Knee joint   space is maintained. There is no knee effusion. A small low-attenuation   focus is noted within the scrotum, nonspecific.      IMPRESSION:  Findings of cellulitis/phlegmon change about the anterolateral mid thigh   tissues. No drainable fluid collection and no evidence of a deeper   infection.    ANTIBIOTICS:  ampicillin/sulbactam  IVPB 3 every 6 hours  Sedimentation Rate, Erythrocyte: 66 mm/Hr *H* (02-12-24 @ 15:20)  C-Reactive Protein, Serum: 3 mg/L (02-12-24 @ 15:20)    IMPRESSION:  59 y/o M with PMH of DM, HTN, HLD, osteomyelitis s/p right ray amputation (7 months ago), alcohol use disorder who presented with right anterior thigh wound for 3 weeks, afebrile, non toxic, no leukocytosis, CRP 3.  CT RLE reviewed with evidence of cellulitis/phlegmon.   No recent hospitalization or abx use.  WBC Count: 6.26 K/uL (02.13.24 @ 05:20)  A1C with Estimated Average Glucose Result: 11.4 (02.12.24 @ 15:20)    -SSSTI- R thigh infected ulcer/cellulitis  -Uncontrolled diabetes mellitus  -Alcohol abuse     PLAN:  Unasyn 3g q6 hrs  Surgery for wound debridement  Follow up cx  DM control  Wound care  Discussed with NP    Please reach ID with any questions or concerns  Dr. Emani Multani  Available in Teams

## 2024-02-13 NOTE — ED ADULT NURSE REASSESSMENT NOTE - NS ED NURSE REASSESS COMMENT FT1
Assume care of patient from ZACHARY Bal
Patient handed off to oncoming RN. Patient stable.
Pt is alert and oriented X4. Pt laying in bed. Pt has a purulent draining abbess on the right anterior thigh. Pt does not endorse any pain at this time.

## 2024-02-13 NOTE — CONSULT NOTE ADULT - ASSESSMENT
59 y/o male with uncontrolled DM w/ R anterior thigh wound   VSS, no leukocytosis    Plan   - bedside debridement today   - IV antibiotics, follow ID recs  - local wound care  - strict glucose control   - remainder of care by primary team   - d/w Dr. Molina 59 y/o male with uncontrolled DM w/ R anterior thigh wound   VSS, no leukocytosis    Plan   - bedside debridement   - IV antibiotics, follow ID recs  - local wound care  - strict glucose control   - remainder of care by primary team   - d/w Dr. Molina

## 2024-02-14 DIAGNOSIS — Z02.9 ENCOUNTER FOR ADMINISTRATIVE EXAMINATIONS, UNSPECIFIED: ICD-10-CM

## 2024-02-14 LAB
ALBUMIN SERPL ELPH-MCNC: 2.8 G/DL — LOW (ref 3.5–5)
ALP SERPL-CCNC: 54 U/L — SIGNIFICANT CHANGE UP (ref 40–120)
ALT FLD-CCNC: 30 U/L DA — SIGNIFICANT CHANGE UP (ref 10–60)
ANION GAP SERPL CALC-SCNC: 8 MMOL/L — SIGNIFICANT CHANGE UP (ref 5–17)
AST SERPL-CCNC: 30 U/L — SIGNIFICANT CHANGE UP (ref 10–40)
BILIRUB SERPL-MCNC: 0.5 MG/DL — SIGNIFICANT CHANGE UP (ref 0.2–1.2)
BUN SERPL-MCNC: 17 MG/DL — SIGNIFICANT CHANGE UP (ref 7–18)
CALCIUM SERPL-MCNC: 8.8 MG/DL — SIGNIFICANT CHANGE UP (ref 8.4–10.5)
CHLORIDE SERPL-SCNC: 105 MMOL/L — SIGNIFICANT CHANGE UP (ref 96–108)
CO2 SERPL-SCNC: 22 MMOL/L — SIGNIFICANT CHANGE UP (ref 22–31)
CREAT SERPL-MCNC: 1.04 MG/DL — SIGNIFICANT CHANGE UP (ref 0.5–1.3)
EGFR: 82 ML/MIN/1.73M2 — SIGNIFICANT CHANGE UP
GLUCOSE BLDC GLUCOMTR-MCNC: 154 MG/DL — HIGH (ref 70–99)
GLUCOSE BLDC GLUCOMTR-MCNC: 163 MG/DL — HIGH (ref 70–99)
GLUCOSE BLDC GLUCOMTR-MCNC: 169 MG/DL — HIGH (ref 70–99)
GLUCOSE BLDC GLUCOMTR-MCNC: 178 MG/DL — HIGH (ref 70–99)
GLUCOSE BLDC GLUCOMTR-MCNC: 247 MG/DL — HIGH (ref 70–99)
GLUCOSE SERPL-MCNC: 185 MG/DL — HIGH (ref 70–99)
HCT VFR BLD CALC: 40.1 % — SIGNIFICANT CHANGE UP (ref 39–50)
HGB BLD-MCNC: 13.4 G/DL — SIGNIFICANT CHANGE UP (ref 13–17)
MAGNESIUM SERPL-MCNC: 1.7 MG/DL — SIGNIFICANT CHANGE UP (ref 1.6–2.6)
MCHC RBC-ENTMCNC: 31.8 PG — SIGNIFICANT CHANGE UP (ref 27–34)
MCHC RBC-ENTMCNC: 33.4 GM/DL — SIGNIFICANT CHANGE UP (ref 32–36)
MCV RBC AUTO: 95 FL — SIGNIFICANT CHANGE UP (ref 80–100)
NRBC # BLD: 0 /100 WBCS — SIGNIFICANT CHANGE UP (ref 0–0)
PHOSPHATE SERPL-MCNC: 3.4 MG/DL — SIGNIFICANT CHANGE UP (ref 2.5–4.5)
PLATELET # BLD AUTO: 216 K/UL — SIGNIFICANT CHANGE UP (ref 150–400)
POTASSIUM SERPL-MCNC: 4.4 MMOL/L — SIGNIFICANT CHANGE UP (ref 3.5–5.3)
POTASSIUM SERPL-SCNC: 4.4 MMOL/L — SIGNIFICANT CHANGE UP (ref 3.5–5.3)
PROT SERPL-MCNC: 7.7 G/DL — SIGNIFICANT CHANGE UP (ref 6–8.3)
RBC # BLD: 4.22 M/UL — SIGNIFICANT CHANGE UP (ref 4.2–5.8)
RBC # FLD: 12.3 % — SIGNIFICANT CHANGE UP (ref 10.3–14.5)
SODIUM SERPL-SCNC: 135 MMOL/L — SIGNIFICANT CHANGE UP (ref 135–145)
WBC # BLD: 5.35 K/UL — SIGNIFICANT CHANGE UP (ref 3.8–10.5)
WBC # FLD AUTO: 5.35 K/UL — SIGNIFICANT CHANGE UP (ref 3.8–10.5)

## 2024-02-14 PROCEDURE — 11042 DBRDMT SUBQ TIS 1ST 20SQCM/<: CPT

## 2024-02-14 PROCEDURE — 99232 SBSQ HOSP IP/OBS MODERATE 35: CPT

## 2024-02-14 PROCEDURE — 99233 SBSQ HOSP IP/OBS HIGH 50: CPT

## 2024-02-14 RX ORDER — OXYCODONE AND ACETAMINOPHEN 5; 325 MG/1; MG/1
1 TABLET ORAL ONCE
Refills: 0 | Status: DISCONTINUED | OUTPATIENT
Start: 2024-02-14 | End: 2024-02-14

## 2024-02-14 RX ORDER — INSULIN GLARGINE 100 [IU]/ML
6 INJECTION, SOLUTION SUBCUTANEOUS ONCE
Refills: 0 | Status: COMPLETED | OUTPATIENT
Start: 2024-02-14 | End: 2024-02-14

## 2024-02-14 RX ORDER — INSULIN GLARGINE 100 [IU]/ML
6 INJECTION, SOLUTION SUBCUTANEOUS AT BEDTIME
Refills: 0 | Status: DISCONTINUED | OUTPATIENT
Start: 2024-02-14 | End: 2024-02-16

## 2024-02-14 RX ORDER — INSULIN LISPRO 100/ML
2 VIAL (ML) SUBCUTANEOUS
Refills: 0 | Status: DISCONTINUED | OUTPATIENT
Start: 2024-02-14 | End: 2024-02-16

## 2024-02-14 RX ADMIN — LOSARTAN POTASSIUM 100 MILLIGRAM(S): 100 TABLET, FILM COATED ORAL at 05:26

## 2024-02-14 RX ADMIN — Medication 2 UNIT(S): at 12:11

## 2024-02-14 RX ADMIN — Medication 2: at 21:50

## 2024-02-14 RX ADMIN — Medication 1: at 08:33

## 2024-02-14 RX ADMIN — AMPICILLIN SODIUM AND SULBACTAM SODIUM 200 GRAM(S): 250; 125 INJECTION, POWDER, FOR SUSPENSION INTRAMUSCULAR; INTRAVENOUS at 18:21

## 2024-02-14 RX ADMIN — Medication 2 UNIT(S): at 08:33

## 2024-02-14 RX ADMIN — AMLODIPINE BESYLATE 10 MILLIGRAM(S): 2.5 TABLET ORAL at 05:27

## 2024-02-14 RX ADMIN — ENOXAPARIN SODIUM 40 MILLIGRAM(S): 100 INJECTION SUBCUTANEOUS at 00:45

## 2024-02-14 RX ADMIN — GABAPENTIN 100 MILLIGRAM(S): 400 CAPSULE ORAL at 13:47

## 2024-02-14 RX ADMIN — Medication 650 MILLIGRAM(S): at 16:55

## 2024-02-14 RX ADMIN — AMPICILLIN SODIUM AND SULBACTAM SODIUM 200 GRAM(S): 250; 125 INJECTION, POWDER, FOR SUSPENSION INTRAMUSCULAR; INTRAVENOUS at 12:22

## 2024-02-14 RX ADMIN — Medication 2 UNIT(S): at 17:35

## 2024-02-14 RX ADMIN — GABAPENTIN 100 MILLIGRAM(S): 400 CAPSULE ORAL at 21:49

## 2024-02-14 RX ADMIN — INSULIN GLARGINE 6 UNIT(S): 100 INJECTION, SOLUTION SUBCUTANEOUS at 21:49

## 2024-02-14 RX ADMIN — Medication 1: at 17:34

## 2024-02-14 RX ADMIN — GABAPENTIN 100 MILLIGRAM(S): 400 CAPSULE ORAL at 05:27

## 2024-02-14 RX ADMIN — ATORVASTATIN CALCIUM 20 MILLIGRAM(S): 80 TABLET, FILM COATED ORAL at 21:49

## 2024-02-14 RX ADMIN — Medication 650 MILLIGRAM(S): at 17:35

## 2024-02-14 RX ADMIN — AMPICILLIN SODIUM AND SULBACTAM SODIUM 200 GRAM(S): 250; 125 INJECTION, POWDER, FOR SUSPENSION INTRAMUSCULAR; INTRAVENOUS at 01:22

## 2024-02-14 RX ADMIN — Medication 1: at 12:10

## 2024-02-14 RX ADMIN — INSULIN GLARGINE 6 UNIT(S): 100 INJECTION, SOLUTION SUBCUTANEOUS at 03:15

## 2024-02-14 RX ADMIN — AMPICILLIN SODIUM AND SULBACTAM SODIUM 200 GRAM(S): 250; 125 INJECTION, POWDER, FOR SUSPENSION INTRAMUSCULAR; INTRAVENOUS at 05:26

## 2024-02-14 NOTE — PROGRESS NOTE ADULT - PROBLEM SELECTOR PLAN 2
- Patient takes metformin, glipizide, and Tradjenta at home.   - Hold oral hypoglycemics.   - Start SSI.   - C/W Lantus 6U & Admelog 2U TID  - F/U A1C.

## 2024-02-14 NOTE — ADVANCED PRACTICE NURSE CONSULT - ASSESSMENT
This is a 58yr old male patient admitted for Cellulitis, presenting with a R. Inner Thigh Abscess, to which the patient is being followed by Surgery with a treatment plan in place to address this issue. There is currently no further need for wound care specialist consultation at this time

## 2024-02-14 NOTE — CONSULT NOTE ADULT - PROBLEM SELECTOR RECOMMENDATION 9
Pt takes multiple oral agents at home including metformin, Glipizide, and Trajenta  A1C: 11.4  Inpatient Recs:  Pt appears to be well controlled on current regimen  cont Lantus 6  cont Lispro 2 premeal  cont Insulin Sliding Scale Pt takes multiple oral agents at home including metformin, Glipizide, and Tradjenta  A1C: 11.4  Inpatient Recs:  Pt appears to be well controlled on current regimen  cont Lantus 6  cont Lispro 2 premeal  cont Insulin Sliding Scale  fsg ac and hs  nutrition eval  adjust meds upon d/c

## 2024-02-14 NOTE — CONSULT NOTE ADULT - ASSESSMENT
Patient is a 57 y/o M with PMH of DM, HTN, HLD, osteomyelitis s/p right ray amputation (7 months ago), alcohol use disorder who presented with right anterior thigh wound for 3 weeks.  Pt presents with uncontrolled diabetes for which he takes multiple oral agents at home. Pt now has infected wound on his right thigh.

## 2024-02-14 NOTE — PROGRESS NOTE ADULT - PROBLEM SELECTOR PLAN 1
- P/w right anterior thigh wound with purulent discharge.    - CT LE - Findings of cellulitis/phlegmon change about the anterolateral mid thigh tissues. No drainable fluid collection and no evidence of a deeper infection.  - ESR - 66. Bcx: NGTD  - S/p cefepime and vancomycin in ED.   - C/W unasyn   - F/U wound swab  - ANDIE: Normal  - Consulted ID - Dr. Gutierres.   - Consulted surgery - for possible wound debridement.

## 2024-02-14 NOTE — PROGRESS NOTE ADULT - SUBJECTIVE AND OBJECTIVE BOX
INTERVAL HPI/OVERNIGHT EVENTS:    Pt seen and examined at bedside. No changes overnight.    Vital Signs Last 24 Hrs  T(C): 36.6 (14 Feb 2024 05:07), Max: 36.7 (13 Feb 2024 19:39)  T(F): 97.9 (14 Feb 2024 05:07), Max: 98 (13 Feb 2024 19:39)  HR: 60 (14 Feb 2024 05:07) (60 - 71)  BP: 130/82 (14 Feb 2024 05:07) (116/72 - 131/82)  BP(mean): 87 (13 Feb 2024 23:42) (87 - 87)  RR: 16 (14 Feb 2024 05:07) (16 - 18)  SpO2: 99% (14 Feb 2024 05:07) (96% - 99%)    Parameters below as of 14 Feb 2024 05:07  Patient On (Oxygen Delivery Method): room air      I&O's Detail    ampicillin/sulbactam  IVPB 3 Gram(s) IV Intermittent every 6 hours      Physical Exam  General: No acute distress  Extremities: R chronic thigh wound. around 3c, by 3cm with slough, small area of eschar superiorlateral skin edge. Purulent drainage expressed from wound. Surrounding induration palpated w/ skin peeling noted. Nonerythematous. No crepitus. Medially, small area of induration draining minimally. Nontender. thigh soft. surrounding skin no changes.    Drains/Tubes:     Labs:                        13.4   5.35  )-----------( 216      ( 14 Feb 2024 08:00 )             40.1     02-14    135  |  105  |  17  ----------------------------<  185<H>  4.4   |  22  |  1.04    Ca    8.8      14 Feb 2024 08:00  Phos  3.4     02-14  Mg     1.7     02-14    TPro  7.7  /  Alb  2.8<L>  /  TBili  0.5  /  DBili  x   /  AST  30  /  ALT  30  /  AlkPhos  54  02-14    PT/INR - ( 12 Feb 2024 15:20 )   PT: 12.6 sec;   INR: 1.11 ratio         PTT - ( 12 Feb 2024 15:20 )  PTT:35.8 sec    RADIOLOGY & ADDITIONAL STUDIES:    58yMale

## 2024-02-14 NOTE — PROCEDURE NOTE - ADDITIONAL PROCEDURE DETAILS
bedside debridement of upper thigh wound   consent obtained, placed in chart  culture taken bedside debridement of upper thigh wound   consent obtained, placed in chart  culture taken  Wound was debrided to clean tissue

## 2024-02-14 NOTE — CONSULT NOTE ADULT - SUBJECTIVE AND OBJECTIVE BOX
Patient is a 58y old  Male who presents with a chief complaint of Right anterior thigh wound. (14 Feb 2024 13:33)      HPI:  Patient is a 57 y/o M with PMH of DM, HTN, HLD, osteomyelitis s/p right ray amputation (7 months ago), alcohol use disorder who presented with right anterior thigh wound for 3 weeks. Patient denies any trauma to his thigh and reports that initially a hard slightly raised lesion formed on his thigh which turned into a blister which burst leaking purulent discharge. Patient reports that in the past 3 weeks the wound has worsened and grown in size with intermittent purulent drainage. Patient reports the wound and the surrounding area in tender to touch, and sometimes gets warm and erythematous. Patient denies any localized swelling. Patient reports that he saw his PCP for it but the PCP did not give him any antibiotics. Patient denies any associated fevers, chills, headache, lightheadedness, malaise, or weakness. Patient denies any recent chest pain, palpitations, shortness of breath, cough, nausea, vomiting, abdominal pain, diarrhea, constipation, melena, hematochezia, dysuria, urinary frequency, or urgency. Patient denies any other complains at this time.   (12 Feb 2024 22:11)  Pt presents with uncontrolled diabetes for which he takes multiple oral agents at home. Pt now has infected wound on his right thigh.      PAST MEDICAL & SURGICAL HISTORY:  Alcohol abuse      Diabetes mellitus      Hypertension      Hyperlipidemia      No significant past surgical history             MEDICATIONS  (STANDING):  amLODIPine   Tablet 10 milliGRAM(s) Oral daily  ampicillin/sulbactam  IVPB 3 Gram(s) IV Intermittent every 6 hours  atorvastatin 20 milliGRAM(s) Oral at bedtime  enoxaparin Injectable 40 milliGRAM(s) SubCutaneous every 24 hours  gabapentin 100 milliGRAM(s) Oral three times a day  insulin glargine Injectable (LANTUS) 6 Unit(s) SubCutaneous at bedtime  insulin lispro (ADMELOG) corrective regimen sliding scale   SubCutaneous Before meals and at bedtime  insulin lispro Injectable (ADMELOG) 2 Unit(s) SubCutaneous three times a day before meals  losartan 100 milliGRAM(s) Oral daily    MEDICATIONS  (PRN):  acetaminophen     Tablet .. 650 milliGRAM(s) Oral every 6 hours PRN Temp greater or equal to 38C (100.4F), Mild Pain (1 - 3)  melatonin 3 milliGRAM(s) Oral at bedtime PRN Insomnia  ondansetron Injectable 4 milliGRAM(s) IV Push every 8 hours PRN Nausea and/or Vomiting      FAMILY HISTORY:      SOCIAL HISTORY:      REVIEW OF SYSTEMS:  CONSTITUTIONAL: No fever, weight loss, or fatigue  EYES: No eye pain, visual disturbances, or discharge  ENT:  No difficulty hearing, tinnitus, vertigo; No sinus or throat pain  NECK: No pain or stiffness  RESPIRATORY: No cough, wheezing, chills or hemoptysis; No Shortness of Breath  CARDIOVASCULAR: No chest pain, palpitations, passing out, dizziness, or leg swelling  GASTROINTESTINAL: No abdominal or epigastric pain. No nausea, vomiting, or hematemesis; No diarrhea or constipation. No melena or hematochezia.  GENITOURINARY: No dysuria, frequency, hematuria, or incontinence  NEUROLOGICAL: No headaches, memory loss, loss of strength, numbness, or tremors  SKIN: No itching, burning, rashes, or lesions   LYMPH Nodes: No enlarged glands  ENDOCRINE: No heat or cold intolerance; No hair loss  MUSCULOSKELETAL: No joint pain or swelling; No muscle, back, or extremity pain  PSYCHIATRIC: No depression, anxiety, mood swings, or difficulty sleeping  HEME/LYMPH: No easy bruising, or bleeding gums  ALLERGY AND IMMUNOLOGIC: No hives or eczema	        Vital Signs Last 24 Hrs  T(C): 36.4 (14 Feb 2024 12:11), Max: 36.7 (13 Feb 2024 19:39)  T(F): 97.6 (14 Feb 2024 12:11), Max: 98 (13 Feb 2024 19:39)  HR: 83 (14 Feb 2024 12:11) (60 - 83)  BP: 130/82 (14 Feb 2024 12:11) (116/72 - 131/82)  BP(mean): 87 (13 Feb 2024 23:42) (87 - 87)  RR: 17 (14 Feb 2024 12:11) (16 - 18)  SpO2: 96% (14 Feb 2024 12:11) (96% - 99%)    Parameters below as of 14 Feb 2024 12:11  Patient On (Oxygen Delivery Method): room air          Constitutional:    NC/AT:    HEENT:    Neck:  No JVD, bruits or thyromegaly    Respiratory:  Clear without rales or rhonchi    Cardiovascular:  RR without murmur, rub or gallop.    Gastrointestinal: Soft without hepatosplenomegaly.    Extremities: without cyanosis, clubbing or edema.    Neurological:  Oriented   x      . No gross sensory or motor defects.        LABS:                        13.4   5.35  )-----------( 216      ( 14 Feb 2024 08:00 )             40.1     02-14    135  |  105  |  17  ----------------------------<  185<H>  4.4   |  22  |  1.04    Ca    8.8      14 Feb 2024 08:00  Phos  3.4     02-14  Mg     1.7     02-14    TPro  7.7  /  Alb  2.8<L>  /  TBili  0.5  /  DBili  x   /  AST  30  /  ALT  30  /  AlkPhos  54  02-14        PT/INR - ( 12 Feb 2024 15:20 )   PT: 12.6 sec;   INR: 1.11 ratio         PTT - ( 12 Feb 2024 15:20 )  PTT:35.8 sec  Urinalysis Basic - ( 14 Feb 2024 08:00 )    Color: x / Appearance: x / SG: x / pH: x  Gluc: 185 mg/dL / Ketone: x  / Bili: x / Urobili: x   Blood: x / Protein: x / Nitrite: x   Leuk Esterase: x / RBC: x / WBC x   Sq Epi: x / Non Sq Epi: x / Bacteria: x      CAPILLARY BLOOD GLUCOSE      POCT Blood Glucose.: 169 mg/dL (14 Feb 2024 11:34)  POCT Blood Glucose.: 154 mg/dL (14 Feb 2024 08:23)  POCT Blood Glucose.: 178 mg/dL (14 Feb 2024 03:12)  POCT Blood Glucose.: 256 mg/dL (13 Feb 2024 21:24)  POCT Blood Glucose.: 186 mg/dL (13 Feb 2024 16:33)      RADIOLOGY & ADDITIONAL STUDIES: Patient is a 58y old  Male who presents with a chief complaint of Right anterior thigh wound. (14 Feb 2024 13:33)      HPI:  Patient is a 57 y/o M with PMH of DM, HTN, HLD, osteomyelitis s/p right ray amputation (7 months ago), alcohol use disorder who presented with right anterior thigh wound for 3 weeks. Patient denies any trauma to his thigh and reports that initially a hard slightly raised lesion formed on his thigh which turned into a blister which burst leaking purulent discharge. Patient reports that in the past 3 weeks the wound has worsened and grown in size with intermittent purulent drainage. Patient reports the wound and the surrounding area in tender to touch, and sometimes gets warm and erythematous. Patient denies any localized swelling. Patient reports that he saw his PCP for it but the PCP did not give him any antibiotics. Patient denies any associated fevers, chills, headache, lightheadedness, malaise, or weakness. Patient denies any recent chest pain, palpitations, shortness of breath, cough, nausea, vomiting, abdominal pain, diarrhea, constipation, melena, hematochezia, dysuria, urinary frequency, or urgency. Patient denies any other complains at this time.   (12 Feb 2024 22:11)  Pt presents with uncontrolled diabetes for which he takes multiple oral agents at home. Pt now has infected wound on his right thigh.      PAST MEDICAL & SURGICAL HISTORY:  Alcohol abuse      Diabetes mellitus      Hypertension      Hyperlipidemia      No significant past surgical history             MEDICATIONS  (STANDING):  amLODIPine   Tablet 10 milliGRAM(s) Oral daily  ampicillin/sulbactam  IVPB 3 Gram(s) IV Intermittent every 6 hours  atorvastatin 20 milliGRAM(s) Oral at bedtime  enoxaparin Injectable 40 milliGRAM(s) SubCutaneous every 24 hours  gabapentin 100 milliGRAM(s) Oral three times a day  insulin glargine Injectable (LANTUS) 6 Unit(s) SubCutaneous at bedtime  insulin lispro (ADMELOG) corrective regimen sliding scale   SubCutaneous Before meals and at bedtime  insulin lispro Injectable (ADMELOG) 2 Unit(s) SubCutaneous three times a day before meals  losartan 100 milliGRAM(s) Oral daily    MEDICATIONS  (PRN):  acetaminophen     Tablet .. 650 milliGRAM(s) Oral every 6 hours PRN Temp greater or equal to 38C (100.4F), Mild Pain (1 - 3)  melatonin 3 milliGRAM(s) Oral at bedtime PRN Insomnia  ondansetron Injectable 4 milliGRAM(s) IV Push every 8 hours PRN Nausea and/or Vomiting      FAMILY HISTORY:      SOCIAL HISTORY:      REVIEW OF SYSTEMS:  CONSTITUTIONAL: No fever, weight loss, or fatigue  EYES: No eye pain, visual disturbances, or discharge  ENT:  No difficulty hearing, tinnitus, vertigo; No sinus or throat pain  NECK: No pain or stiffness  RESPIRATORY: No cough, wheezing, chills or hemoptysis; No Shortness of Breath  CARDIOVASCULAR: No chest pain, palpitations, passing out, dizziness, or leg swelling  GASTROINTESTINAL: No abdominal or epigastric pain. No nausea, vomiting, or hematemesis; No diarrhea or constipation. No melena or hematochezia.  GENITOURINARY: No dysuria, frequency, hematuria, or incontinence  NEUROLOGICAL: No headaches, memory loss, loss of strength, numbness, or tremors  SKIN: No itching, burning, rashes, or lesions   LYMPH Nodes: No enlarged glands  ENDOCRINE: No heat or cold intolerance; No hair loss  MUSCULOSKELETAL: No joint pain or swelling; No muscle, back, or extremity pain  PSYCHIATRIC: No depression, anxiety, mood swings, or difficulty sleeping  HEME/LYMPH: No easy bruising, or bleeding gums  ALLERGY AND IMMUNOLOGIC: No hives or eczema	        Vital Signs Last 24 Hrs  T(C): 36.4 (14 Feb 2024 12:11), Max: 36.7 (13 Feb 2024 19:39)  T(F): 97.6 (14 Feb 2024 12:11), Max: 98 (13 Feb 2024 19:39)  HR: 83 (14 Feb 2024 12:11) (60 - 83)  BP: 130/82 (14 Feb 2024 12:11) (116/72 - 131/82)  BP(mean): 87 (13 Feb 2024 23:42) (87 - 87)  RR: 17 (14 Feb 2024 12:11) (16 - 18)  SpO2: 96% (14 Feb 2024 12:11) (96% - 99%)    Parameters below as of 14 Feb 2024 12:11  Patient On (Oxygen Delivery Method): room air          Constitutional:    HEENT: nad    Neck:  No JVD, bruits or thyromegaly    Respiratory:  Clear without rales or rhonchi    Cardiovascular:  RR without murmur, rub or gallop.    Gastrointestinal: Soft without hepatosplenomegaly.    Extremities: without cyanosis, clubbing or edema.    Neurological:  Oriented   x   3   . No gross sensory or motor defects.        LABS:                        13.4   5.35  )-----------( 216      ( 14 Feb 2024 08:00 )             40.1     02-14    135  |  105  |  17  ----------------------------<  185<H>  4.4   |  22  |  1.04    Ca    8.8      14 Feb 2024 08:00  Phos  3.4     02-14  Mg     1.7     02-14    TPro  7.7  /  Alb  2.8<L>  /  TBili  0.5  /  DBili  x   /  AST  30  /  ALT  30  /  AlkPhos  54  02-14        PT/INR - ( 12 Feb 2024 15:20 )   PT: 12.6 sec;   INR: 1.11 ratio         PTT - ( 12 Feb 2024 15:20 )  PTT:35.8 sec  Urinalysis Basic - ( 14 Feb 2024 08:00 )    Color: x / Appearance: x / SG: x / pH: x  Gluc: 185 mg/dL / Ketone: x  / Bili: x / Urobili: x   Blood: x / Protein: x / Nitrite: x   Leuk Esterase: x / RBC: x / WBC x   Sq Epi: x / Non Sq Epi: x / Bacteria: x      CAPILLARY BLOOD GLUCOSE      POCT Blood Glucose.: 169 mg/dL (14 Feb 2024 11:34)  POCT Blood Glucose.: 154 mg/dL (14 Feb 2024 08:23)  POCT Blood Glucose.: 178 mg/dL (14 Feb 2024 03:12)  POCT Blood Glucose.: 256 mg/dL (13 Feb 2024 21:24)  POCT Blood Glucose.: 186 mg/dL (13 Feb 2024 16:33)      RADIOLOGY & ADDITIONAL STUDIES:

## 2024-02-14 NOTE — PROGRESS NOTE ADULT - SUBJECTIVE AND OBJECTIVE BOX
NP Note discussed with  primary attending    Patient is a 58y old  Male who presents with a chief complaint of Right anterior thigh wound. (13 Feb 2024 20:26)      INTERVAL HPI/OVERNIGHT EVENTS: no new complaints    MEDICATIONS  (STANDING):  amLODIPine   Tablet 10 milliGRAM(s) Oral daily  ampicillin/sulbactam  IVPB 3 Gram(s) IV Intermittent every 6 hours  atorvastatin 20 milliGRAM(s) Oral at bedtime  enoxaparin Injectable 40 milliGRAM(s) SubCutaneous every 24 hours  gabapentin 100 milliGRAM(s) Oral three times a day  insulin glargine Injectable (LANTUS) 6 Unit(s) SubCutaneous at bedtime  insulin lispro (ADMELOG) corrective regimen sliding scale   SubCutaneous Before meals and at bedtime  insulin lispro Injectable (ADMELOG) 2 Unit(s) SubCutaneous three times a day before meals  losartan 100 milliGRAM(s) Oral daily    MEDICATIONS  (PRN):  acetaminophen     Tablet .. 650 milliGRAM(s) Oral every 6 hours PRN Temp greater or equal to 38C (100.4F), Mild Pain (1 - 3)  melatonin 3 milliGRAM(s) Oral at bedtime PRN Insomnia  ondansetron Injectable 4 milliGRAM(s) IV Push every 8 hours PRN Nausea and/or Vomiting      __________________________________________________  REVIEW OF SYSTEMS:    CONSTITUTIONAL: No fever,   EYES: no acute visual disturbances  NECK: No pain or stiffness  RESPIRATORY: No cough; No shortness of breath  CARDIOVASCULAR: No chest pain, no palpitations  GASTROINTESTINAL: No pain. No nausea or vomiting; No diarrhea   NEUROLOGICAL: No headache or numbness, no tremors  MUSCULOSKELETAL: No joint pain, no muscle pain  GENITOURINARY: no dysuria, no frequency, no hesitancy  PSYCHIATRY: no depression , no anxiety  ALL OTHER  ROS negative        Vital Signs Last 24 Hrs  T(C): 36.6 (14 Feb 2024 05:07), Max: 36.7 (13 Feb 2024 19:39)  T(F): 97.9 (14 Feb 2024 05:07), Max: 98 (13 Feb 2024 19:39)  HR: 60 (14 Feb 2024 05:07) (60 - 71)  BP: 130/82 (14 Feb 2024 05:07) (116/72 - 131/82)  BP(mean): 87 (13 Feb 2024 23:42) (87 - 87)  RR: 16 (14 Feb 2024 05:07) (16 - 18)  SpO2: 99% (14 Feb 2024 05:07) (96% - 99%)    Parameters below as of 14 Feb 2024 05:07  Patient On (Oxygen Delivery Method): room air        ________________________________________________  PHYSICAL EXAM:  GENERAL: NAD  HEENT: Normocephalic;  conjunctivae and sclerae clear; moist mucous membranes;   NECK : supple  CHEST/LUNG: Clear to ausculitation bilaterally with good air entry   HEART: S1 S2  regular; no murmurs, gallops or rubs  ABDOMEN: Soft, Nontender, Nondistended; Bowel sounds present  EXTREMITIES: Right upper thigh wound with purulent drainage,   SKIN: warm and dry; no rash  NERVOUS SYSTEM:  Awake and alert; Oriented  to place, person and time ; no new deficits    _________________________________________________  LABS:                        13.4   5.35  )-----------( 216      ( 14 Feb 2024 08:00 )             40.1     02-14    135  |  105  |  17  ----------------------------<  185<H>  4.4   |  22  |  1.04    Ca    8.8      14 Feb 2024 08:00  Phos  3.4     02-14  Mg     1.7     02-14    TPro  7.7  /  Alb  2.8<L>  /  TBili  0.5  /  DBili  x   /  AST  30  /  ALT  30  /  AlkPhos  54  02-14    PT/INR - ( 12 Feb 2024 15:20 )   PT: 12.6 sec;   INR: 1.11 ratio         PTT - ( 12 Feb 2024 15:20 )  PTT:35.8 sec  Urinalysis Basic - ( 14 Feb 2024 08:00 )    Color: x / Appearance: x / SG: x / pH: x  Gluc: 185 mg/dL / Ketone: x  / Bili: x / Urobili: x   Blood: x / Protein: x / Nitrite: x   Leuk Esterase: x / RBC: x / WBC x   Sq Epi: x / Non Sq Epi: x / Bacteria: x      CAPILLARY BLOOD GLUCOSE      POCT Blood Glucose.: 154 mg/dL (14 Feb 2024 08:23)  POCT Blood Glucose.: 178 mg/dL (14 Feb 2024 03:12)  POCT Blood Glucose.: 256 mg/dL (13 Feb 2024 21:24)  POCT Blood Glucose.: 186 mg/dL (13 Feb 2024 16:33)        RADIOLOGY & ADDITIONAL TESTS:  < from: [facility] Physiol Extremity Lower 3+ Level, BI (02.13.24 @ 09:33) >  IMPRESSION: No Doppler evidence of hemodynamically significant arterial   flow limitation in either lower extremity.    --- End of Report ---    < end of copied text >    Imaging Personally Reviewed:  YES    Consultant(s) Notes Reviewed:   YES    Care Discussed with Consultants :     Plan of care was discussed with patient and /or primary care giver; all questions and concerns were addressed and care was aligned with patient's wishes.

## 2024-02-14 NOTE — PROGRESS NOTE ADULT - ASSESSMENT
***incomplete Subjective:    MEDS:  acetaminophen     Tablet .. 650 milliGRAM(s) Oral every 6 hours PRN  amLODIPine   Tablet 10 milliGRAM(s) Oral daily  atorvastatin 20 milliGRAM(s) Oral at bedtime  enoxaparin Injectable 40 milliGRAM(s) SubCutaneous every 24 hours  gabapentin 100 milliGRAM(s) Oral three times a day  insulin glargine Injectable (LANTUS) 6 Unit(s) SubCutaneous at bedtime  insulin lispro (ADMELOG) corrective regimen sliding scale   SubCutaneous Before meals and at bedtime  insulin lispro Injectable (ADMELOG) 2 Unit(s) SubCutaneous three times a day before meals  losartan 100 milliGRAM(s) Oral daily  melatonin 3 milliGRAM(s) Oral at bedtime PRN  ondansetron Injectable 4 milliGRAM(s) IV Push every 8 hours PRN      REVIEW OF SYSTEMS:  CONSTITUTIONAL:  No fevers or chills  EYES/ENT:  No visual changes; no throat pain   NECK:  No neck pain or stiffness  RESPIRATORY:  No cough, no wheezing. No shortness of breath  CARDIOVASCULAR:  No chest pain or palpitations  GASTROINTESTINAL:  No abdominal pain. No N/V or diarrhea  GENITOURINARY:  No dysuria, frequency or hematuria  NEUROLOGICAL:  No numbness or weakness  MSK: no back pain, no joint pain  SKIN:  No itching, no skin rash    PE:  Vital Signs Last 24 Hrs  T(C): 36.4 (14 Feb 2024 12:11), Max: 36.7 (13 Feb 2024 23:42)  T(F): 97.6 (14 Feb 2024 12:11), Max: 98 (13 Feb 2024 23:42)  HR: 83 (14 Feb 2024 12:11) (60 - 83)  BP: 130/82 (14 Feb 2024 12:11) (118/73 - 131/82)  BP(mean): 87 (13 Feb 2024 23:42) (87 - 87)  RR: 17 (14 Feb 2024 12:11) (16 - 18)  SpO2: 96% (14 Feb 2024 12:11) (96% - 99%)    Parameters below as of 14 Feb 2024 12:11  Patient On (Oxygen Delivery Method): room air      Gen: AOx3, NAD, non-toxic, pleasant  HEAD:  Atraumatic  EYES: PERRLA, conjunctiva and sclera clear  ENT: Moist mucous membranes  NECK: Supple, No JVD  CV: S1+S2 normal, no murmurs  Resp: Clear bilat, no resp distress, no crackles/wheezes  Abd: Soft, nontender, +BS  Ext: No LE edema, no cyanosis, pulses +  : No Cr  IV/Skin: No thrombophlebitis  Msk: No low back pain, no arthralgias, no joint swelling  Neuro: AAOx3. No sensory deficits, no motor deficits  Psych: no anxiety, no delusional ideas, no suicidal ideation    LABS/DIAGNOSTIC TESTS:                        13.4   5.35  )-----------( 216      ( 14 Feb 2024 08:00 )             40.1     WBC Count: 5.35 K/uL (02-14 @ 08:00)  WBC Count: 6.26 K/uL (02-13 @ 05:20)  WBC Count: 6.59 K/uL (02-12 @ 15:20)    02-14    135  |  105  |  17  ----------------------------<  185<H>  4.4   |  22  |  1.04    Ca    8.8      14 Feb 2024 08:00  Phos  3.4     02-14  Mg     1.7     02-14    TPro  7.7  /  Alb  2.8<L>  /  TBili  0.5  /  DBili  x   /  AST  30  /  ALT  30  /  AlkPhos  54  02-14    Urinalysis Basic - ( 14 Feb 2024 08:00 )    Color: x / Appearance: x / SG: x / pH: x  Gluc: 185 mg/dL / Ketone: x  / Bili: x / Urobili: x   Blood: x / Protein: x / Nitrite: x   Leuk Esterase: x / RBC: x / WBC x   Sq Epi: x / Non Sq Epi: x / Bacteria: x      LACTATE:    CULTURES:     Culture - Blood (collected 02-12-24 @ 15:30)  Source: .Blood Blood-Peripheral  Preliminary Report (02-13-24 @ 22:01):    No growth at 24 hours    Culture - Blood (collected 02-12-24 @ 15:20)  Source: .Blood Blood-Peripheral  Preliminary Report (02-13-24 @ 22:01):    No growth at 24 hours    RADIOLOGY:   Available pertinent Imaging reviewed    ANTIBIOTICS:  ampicillin/sulbactam  IVPB 3 every 6 hours     IMPRESSION:  59 y/o M with PMH of DM, HTN, HLD, osteomyelitis s/p right ray amputation (7 months ago), alcohol use disorder admitted for R thigh infected wound cellulitis and uncontrolled DM.   No leukocytosis, no significant elevation of inflammatory markers, no recent hospitalization or abx use.   CT RLE reviewed with evidence of cellulitis/phlegmon.   A1C with Estimated Average Glucose Result: 11.4 (02.12.24 @ 15:20)  S/P wound debridement with surgery 2/14    -SSTI- R thigh infected ulcer/cellulitis  -Uncontrolled diabetes mellitus  -Alcohol abuse     PLAN:  Continue Unasyn 3g q6 hrs  Follow up cx  DM control  Wound care  Discussed with NP    Please reach ID with any questions or concerns  Dr. Emani Multani  Available in Teams               Subjective: NAD, afebrile, R thigh pain on wound area, afebrile, clinically stable, no new symptoms or complains.     REVIEW OF SYSTEMS:  CONSTITUTIONAL:  No fevers or chills  EYES/ENT:  No visual changes; no throat pain   NECK:  No neck pain or stiffness  RESPIRATORY:  No cough, no wheezing. No shortness of breath  CARDIOVASCULAR:  No chest pain or palpitations  GASTROINTESTINAL:  No abdominal pain. No N/V or diarrhea  GENITOURINARY:  No dysuria, frequency or hematuria  NEUROLOGICAL:  No numbness or weakness  MSK: no back pain, R thigh wound +  SKIN:  No itching, no skin rash    PE:  Vital Signs Last 24 Hrs  T(C): 36.4 (14 Feb 2024 12:11), Max: 36.7 (13 Feb 2024 23:42)  T(F): 97.6 (14 Feb 2024 12:11), Max: 98 (13 Feb 2024 23:42)  HR: 83 (14 Feb 2024 12:11) (60 - 83)  BP: 130/82 (14 Feb 2024 12:11) (118/73 - 131/82)  BP(mean): 87 (13 Feb 2024 23:42) (87 - 87)  RR: 17 (14 Feb 2024 12:11) (16 - 18)  SpO2: 96% (14 Feb 2024 12:11) (96% - 99%)    Parameters below as of 14 Feb 2024 12:11  Patient On (Oxygen Delivery Method): room air    Gen: AOx3, NAD, non-toxic, pleasant  HEAD:  Atraumatic  EYES: PERRLA, conjunctiva and sclera clear  ENT: Moist mucous membranes  NECK: Supple, No JVD  CV: S1+S2 normal, no murmurs  Resp: Clear bilat, no resp distress, no crackles/wheezes  Abd: Soft, nontender, +BS  Ext: No LE edema, no cyanosis, pulses +  : No dysuria  IV/Skin: No thrombophlebitis, R lat thigh wound with purulent discharge   Msk: No low back pain  Neuro: AAOx3. No focal signs     LABS/DIAGNOSTIC TESTS:                        13.4   5.35  )-----------( 216      ( 14 Feb 2024 08:00 )             40.1     WBC Count: 5.35 K/uL (02-14 @ 08:00)  WBC Count: 6.26 K/uL (02-13 @ 05:20)  WBC Count: 6.59 K/uL (02-12 @ 15:20)    02-14    135  |  105  |  17  ----------------------------<  185<H>  4.4   |  22  |  1.04    Ca    8.8      14 Feb 2024 08:00  Phos  3.4     02-14  Mg     1.7     02-14    TPro  7.7  /  Alb  2.8<L>  /  TBili  0.5  /  DBili  x   /  AST  30  /  ALT  30  /  AlkPhos  54  02-14    Urinalysis Basic - ( 14 Feb 2024 08:00 )    Color: x / Appearance: x / SG: x / pH: x  Gluc: 185 mg/dL / Ketone: x  / Bili: x / Urobili: x   Blood: x / Protein: x / Nitrite: x   Leuk Esterase: x / RBC: x / WBC x   Sq Epi: x / Non Sq Epi: x / Bacteria: x      LACTATE:    CULTURES:     Culture - Blood (collected 02-12-24 @ 15:30)  Source: .Blood Blood-Peripheral  Preliminary Report (02-13-24 @ 22:01):    No growth at 24 hours    Culture - Blood (collected 02-12-24 @ 15:20)  Source: .Blood Blood-Peripheral  Preliminary Report (02-13-24 @ 22:01):    No growth at 24 hours    RADIOLOGY:   Available pertinent Imaging reviewed    ANTIBIOTICS:  ampicillin/sulbactam  IVPB 3 every 6 hours     IMPRESSION:  59 y/o M with PMH of DM, HTN, HLD, osteomyelitis s/p right ray amputation (7 months ago), alcohol use disorder admitted for R thigh infected wound cellulitis and uncontrolled DM.   No leukocytosis, no significant elevation of inflammatory markers, no recent hospitalization or abx use.   CT RLE reviewed with evidence of cellulitis/phlegmon.   A1C with Estimated Average Glucose Result: 11.4 (02.12.24 @ 15:20)  S/P wound debridement with surgery 2/14    -SSTI- R thigh infected ulcer/cellulitis  -Uncontrolled diabetes mellitus  -Alcohol abuse     PLAN:  Continue Unasyn 3g q6 hrs  Follow up cx  DM control  Wound care  Discussed with NP    Please reach ID with any questions or concerns  Dr. Emani Multani  Available in Teams

## 2024-02-14 NOTE — PROGRESS NOTE ADULT - ASSESSMENT
Patient is a 59 y/o M with PMH of DM, HTN, HLD, osteomyelitis s/p right ray amputation (7 months ago), alcohol use disorder who presented with right anterior thigh wound for 3 weeks. CT < from: CT Lower Extremity w/ IV Cont, Right Findings of cellulitis/phlegmon change about the anterolateral mid thigh tissues. No drainable fluid collection and no evidence of a deeper infection.  Patient is being admitted for management of right anterior thigh infected wound and surrounding cellulitis. CRISTAL Gutierres consulted. Started on Unasyn.

## 2024-02-14 NOTE — PROGRESS NOTE ADULT - ASSESSMENT
59 y/o male with uncontrolled DM w/ R anterior thigh wound   VSS, no leukocytosis    Plan   - bedside debridement, likely today   - IV antibiotics, follow ID recs  - local wound care  - strict glucose control   - remainder of care by primary team   - signed out to carolina MEJIA [phone]

## 2024-02-15 LAB
ANION GAP SERPL CALC-SCNC: 6 MMOL/L — SIGNIFICANT CHANGE UP (ref 5–17)
BUN SERPL-MCNC: 19 MG/DL — HIGH (ref 7–18)
CALCIUM SERPL-MCNC: 9.3 MG/DL — SIGNIFICANT CHANGE UP (ref 8.4–10.5)
CHLORIDE SERPL-SCNC: 103 MMOL/L — SIGNIFICANT CHANGE UP (ref 96–108)
CO2 SERPL-SCNC: 26 MMOL/L — SIGNIFICANT CHANGE UP (ref 22–31)
CREAT SERPL-MCNC: 1.07 MG/DL — SIGNIFICANT CHANGE UP (ref 0.5–1.3)
EGFR: 79 ML/MIN/1.73M2 — SIGNIFICANT CHANGE UP
GLUCOSE BLDC GLUCOMTR-MCNC: 149 MG/DL — HIGH (ref 70–99)
GLUCOSE BLDC GLUCOMTR-MCNC: 192 MG/DL — HIGH (ref 70–99)
GLUCOSE BLDC GLUCOMTR-MCNC: 218 MG/DL — HIGH (ref 70–99)
GLUCOSE BLDC GLUCOMTR-MCNC: 324 MG/DL — HIGH (ref 70–99)
GLUCOSE SERPL-MCNC: 184 MG/DL — HIGH (ref 70–99)
HCT VFR BLD CALC: 42.9 % — SIGNIFICANT CHANGE UP (ref 39–50)
HGB BLD-MCNC: 14.3 G/DL — SIGNIFICANT CHANGE UP (ref 13–17)
MCHC RBC-ENTMCNC: 31.4 PG — SIGNIFICANT CHANGE UP (ref 27–34)
MCHC RBC-ENTMCNC: 33.3 GM/DL — SIGNIFICANT CHANGE UP (ref 32–36)
MCV RBC AUTO: 94.1 FL — SIGNIFICANT CHANGE UP (ref 80–100)
NRBC # BLD: 0 /100 WBCS — SIGNIFICANT CHANGE UP (ref 0–0)
PLATELET # BLD AUTO: 248 K/UL — SIGNIFICANT CHANGE UP (ref 150–400)
POTASSIUM SERPL-MCNC: 4 MMOL/L — SIGNIFICANT CHANGE UP (ref 3.5–5.3)
POTASSIUM SERPL-SCNC: 4 MMOL/L — SIGNIFICANT CHANGE UP (ref 3.5–5.3)
RBC # BLD: 4.56 M/UL — SIGNIFICANT CHANGE UP (ref 4.2–5.8)
RBC # FLD: 12.2 % — SIGNIFICANT CHANGE UP (ref 10.3–14.5)
SODIUM SERPL-SCNC: 135 MMOL/L — SIGNIFICANT CHANGE UP (ref 135–145)
WBC # BLD: 5.23 K/UL — SIGNIFICANT CHANGE UP (ref 3.8–10.5)
WBC # FLD AUTO: 5.23 K/UL — SIGNIFICANT CHANGE UP (ref 3.8–10.5)

## 2024-02-15 PROCEDURE — 99232 SBSQ HOSP IP/OBS MODERATE 35: CPT

## 2024-02-15 PROCEDURE — 99233 SBSQ HOSP IP/OBS HIGH 50: CPT | Mod: GC

## 2024-02-15 RX ADMIN — GABAPENTIN 100 MILLIGRAM(S): 400 CAPSULE ORAL at 05:21

## 2024-02-15 RX ADMIN — Medication 2 UNIT(S): at 17:18

## 2024-02-15 RX ADMIN — Medication 1: at 17:18

## 2024-02-15 RX ADMIN — AMPICILLIN SODIUM AND SULBACTAM SODIUM 200 GRAM(S): 250; 125 INJECTION, POWDER, FOR SUSPENSION INTRAMUSCULAR; INTRAVENOUS at 18:20

## 2024-02-15 RX ADMIN — Medication 2 UNIT(S): at 12:14

## 2024-02-15 RX ADMIN — LOSARTAN POTASSIUM 100 MILLIGRAM(S): 100 TABLET, FILM COATED ORAL at 05:14

## 2024-02-15 RX ADMIN — GABAPENTIN 100 MILLIGRAM(S): 400 CAPSULE ORAL at 13:52

## 2024-02-15 RX ADMIN — AMLODIPINE BESYLATE 10 MILLIGRAM(S): 2.5 TABLET ORAL at 05:14

## 2024-02-15 RX ADMIN — AMPICILLIN SODIUM AND SULBACTAM SODIUM 200 GRAM(S): 250; 125 INJECTION, POWDER, FOR SUSPENSION INTRAMUSCULAR; INTRAVENOUS at 00:06

## 2024-02-15 RX ADMIN — AMPICILLIN SODIUM AND SULBACTAM SODIUM 200 GRAM(S): 250; 125 INJECTION, POWDER, FOR SUSPENSION INTRAMUSCULAR; INTRAVENOUS at 05:14

## 2024-02-15 RX ADMIN — GABAPENTIN 100 MILLIGRAM(S): 400 CAPSULE ORAL at 22:11

## 2024-02-15 RX ADMIN — ENOXAPARIN SODIUM 40 MILLIGRAM(S): 100 INJECTION SUBCUTANEOUS at 00:07

## 2024-02-15 RX ADMIN — Medication 4: at 12:14

## 2024-02-15 RX ADMIN — Medication 2: at 22:11

## 2024-02-15 RX ADMIN — AMPICILLIN SODIUM AND SULBACTAM SODIUM 200 GRAM(S): 250; 125 INJECTION, POWDER, FOR SUSPENSION INTRAMUSCULAR; INTRAVENOUS at 13:52

## 2024-02-15 RX ADMIN — ATORVASTATIN CALCIUM 20 MILLIGRAM(S): 80 TABLET, FILM COATED ORAL at 22:11

## 2024-02-15 RX ADMIN — Medication 2 UNIT(S): at 08:07

## 2024-02-15 RX ADMIN — INSULIN GLARGINE 6 UNIT(S): 100 INJECTION, SOLUTION SUBCUTANEOUS at 22:11

## 2024-02-15 NOTE — PROGRESS NOTE ADULT - NS ATTEND AMEND GEN_ALL_CORE FT
Patient seen/evaluated at bedside on 2/15/24. I agree with the NP progress note/outlined plan of care. My independent findings and conclusions are documented.    s: feels much better. s/p bedside I&D of the right thigh wound. No fevers/chills/diarrhea/nausea/vomiting    vitals reviewed  physical exam: AAOx3 NAD  S1S2 RRR  cTAb/l no accessory muscle use  soft, NT, ND, + BS  no LE edema/cyanosis/clubbing    cbc/cmp/coags reviewed    Right thigh- clean based wound with mild surrounding erythema with packing with serosanguinous fluid    #infected thigh wound  #DM T II- uncontrolled  #HTN  #h/o R foot osteomyelitis s/p R ray amputation  #h/o alcohol use disorder    57 y/o M with PMH of DM, HTN, HLD, osteomyelitis s/p right ray amputation (7 months ago), alcohol use disorder who presented with right anterior thigh wound for 3 weeks. CT of R Lower Extremity w/ IV Cont, Right Findings of cellulitis/phlegmon  with no drainable fluid collection and no evidence of a deeper infection. Initially managed with vancomycin then de-escalated to unasyn with ID guidance. Underwent bedside I&D by surgery team on 2/14/24- well tolerated    follow up surgical tissue culture  -continue with antimicrobial coverage with unasyn, vancomycin has been off since 2/13/24- continue to monitor wound  -lantus 6  units qhs with 2 units TID with meals  -appreciate surgery and endocrinology   -local wound care

## 2024-02-15 NOTE — DISCHARGE NOTE PROVIDER - NSDCCPCAREPLAN_GEN_ALL_CORE_FT
PRINCIPAL DISCHARGE DIAGNOSIS  Diagnosis: Wound cellulitis  Assessment and Plan of Treatment: - You presented with right upper thigh wound, with purulent drainage  - You were evaluated by surgery, & underwent bedside wound cleaning.  - You were treated with IV antibiotics  - Your blood culture was negative for any blood infection.  - Take your medicine with a glass of water or food as told by your doctor until ?????????????  Take the medicine as told. Finish them even if you start to feel better.  Do not give your medicine to other people.  Do not use your medicine in the future for a different infection.  Ask your doctor about which side effects to watch for.  Try not to miss any doses. If you miss a dose, take it as soon as possible        SECONDARY DISCHARGE DIAGNOSES  Diagnosis: Diabetes mellitus  Assessment and Plan of Treatment: - Your infected wound on your right thigh may be likely due to elevated & uncontrolled diabetes  - Your A1c which is the percentage of blood sugar in your blood stream in the last 3 months was abnormally high   - HgA1C this admission was 11.4  - You were evaluated by a diabetic doctor, & insulin regimen was initiated.  Make sure you get your HgA1c checked every three months.  If you take oral diabetes medications, check your blood glucose two times a day.  If you take insulin, check your blood glucose before meals and at bedtime.  It's important not to skip any meals.  Keep a log of your blood glucose results and always take it with you to your doctor appointments.  Keep a list of your current medications including injectables and over the counter medications and bring this medication list with you to all your doctor appointments.  If you have not seen your ophthalmologist this year call for appointment.  Check your feet daily for redness, sores, or openings. Do not self treat. If no improvement in two days call your primary care physician for an appointment.  Low blood sugar (hypoglycemia) is a blood sugar below 70mg/dl. Check your blood sugar if you feel signs/symptoms of hypoglycemia. If your blood sugar is below 70 take 15 grams of carbohydrates (ex 4 oz of apple juice, 3-4 glucose tablets, or 4-6 oz of regular soda) wait 15 minutes and repeat blood sugar to make sure it comes up above 70.  If your blood sugar is above 70 and you are due for a meal, have a meal.  If you are not due for a meal have a snack.  This snack helps keeps your blood sugar at a safe range.      Diagnosis: Hypertension  Assessment and Plan of Treatment: - Continue to take your b/p medications as prescribed  - Amlodipine & Losartan  - Maintain Low salt diet  - Engage Activity as tolerated.  - Take all medication as prescribed.  - Follow up with your medical doctor for routine blood pressure monitoring at your next visit.  - Notify your doctor if you have any of the following symptoms:   Dizziness, Lightheadedness, Blurry vision, Headache, Chest pain, Shortness of breath      Diagnosis: Hyperlipidemia  Assessment and Plan of Treatment: - Continue to take your cholesterole medications as prescribed  - Atorvastatin 20mg dialy     PRINCIPAL DISCHARGE DIAGNOSIS  Diagnosis: Wound cellulitis  Assessment and Plan of Treatment: - You presented with right upper thigh wound, with purulent drainage  - You were evaluated by surgery, & underwent bedside wound cleaning.  - You were treated with IV antibiotics  - Your blood culture was negative for any blood infection.  - Take your medicine with a glass of water or food as told by your doctor until 2/28   Take the medicine as told. Finish them even if you start to feel better.  Do not give your medicine to other people.  Do not use your medicine in the future for a different infection.  Ask your doctor about which side effects to watch for.  Try not to miss any doses. If you miss a dose, take it as soon as possible        SECONDARY DISCHARGE DIAGNOSES  Diagnosis: Diabetes mellitus  Assessment and Plan of Treatment: - Your infected wound on your right thigh may be likely due to elevated & uncontrolled diabetes  - Your A1c which is the percentage of blood sugar in your blood stream in the last 3 months was abnormally high   - HgA1C this admission was 11.4  - You were evaluated by a diabetic doctor, & insulin regimen was initiated.  Make sure you get your HgA1c checked every three months.  If you take oral diabetes medications, check your blood glucose two times a day.  If you take insulin, check your blood glucose before meals and at bedtime.  It's important not to skip any meals.  Keep a log of your blood glucose results and always take it with you to your doctor appointments.  Keep a list of your current medications including injectables and over the counter medications and bring this medication list with you to all your doctor appointments.  If you have not seen your ophthalmologist this year call for appointment.  Check your feet daily for redness, sores, or openings. Do not self treat. If no improvement in two days call your primary care physician for an appointment.  Low blood sugar (hypoglycemia) is a blood sugar below 70mg/dl. Check your blood sugar if you feel signs/symptoms of hypoglycemia. If your blood sugar is below 70 take 15 grams of carbohydrates (ex 4 oz of apple juice, 3-4 glucose tablets, or 4-6 oz of regular soda) wait 15 minutes and repeat blood sugar to make sure it comes up above 70.  If your blood sugar is above 70 and you are due for a meal, have a meal.  If you are not due for a meal have a snack.  This snack helps keeps your blood sugar at a safe range.      Diagnosis: Hypertension  Assessment and Plan of Treatment: - Continue to take your b/p medications as prescribed  - Amlodipine & Losartan  - Maintain Low salt diet  - Engage Activity as tolerated.  - Take all medication as prescribed.  - Follow up with your medical doctor for routine blood pressure monitoring at your next visit.  - Notify your doctor if you have any of the following symptoms:   Dizziness, Lightheadedness, Blurry vision, Headache, Chest pain, Shortness of breath      Diagnosis: Hyperlipidemia  Assessment and Plan of Treatment: - Continue to take your cholesterole medications as prescribed  - Atorvastatin 20mg dialy     PRINCIPAL DISCHARGE DIAGNOSIS  Diagnosis: Wound cellulitis  Assessment and Plan of Treatment: - You presented with right upper thigh wound, with purulent drainage   - You were evaluated by surgery, & underwent bedside wound cleaning.  - You were treated with IV antibiotics  - Your blood culture was negative for any blood infection.  - Take your medicine with a glass of water or food as told by your doctor until 2/28   Take the medicine as told. Finish them even if you start to feel better.  Do not give your medicine to other people.  Do not use your medicine in the future for a different infection.  Ask your doctor about which side effects to watch for.  Try not to miss any doses. If you miss a dose, take it as soon as possible        SECONDARY DISCHARGE DIAGNOSES  Diagnosis: Diabetes mellitus  Assessment and Plan of Treatment: - Your infected wound on your right thigh may be likely due to elevated & uncontrolled diabetes  - Your A1c which is the percentage of blood sugar in your blood stream in the last 3 months was abnormally high   - HgA1C this admission was 11.4  - You were evaluated by a diabetic doctor, & insulin regimen was initiated.  Make sure you get your HgA1c checked every three months.  If you take oral diabetes medications, check your blood glucose two times a day.  If you take insulin, check your blood glucose before meals and at bedtime.  It's important not to skip any meals.  Keep a log of your blood glucose results and always take it with you to your doctor appointments.  Keep a list of your current medications including injectables and over the counter medications and bring this medication list with you to all your doctor appointments.  If you have not seen your ophthalmologist this year call for appointment.  Check your feet daily for redness, sores, or openings. Do not self treat. If no improvement in two days call your primary care physician for an appointment.  Low blood sugar (hypoglycemia) is a blood sugar below 70mg/dl. Check your blood sugar if you feel signs/symptoms of hypoglycemia. If your blood sugar is below 70 take 15 grams of carbohydrates (ex 4 oz of apple juice, 3-4 glucose tablets, or 4-6 oz of regular soda) wait 15 minutes and repeat blood sugar to make sure it comes up above 70.  If your blood sugar is above 70 and you are due for a meal, have a meal.  If you are not due for a meal have a snack.  This snack helps keeps your blood sugar at a safe range.      Diagnosis: Hypertension  Assessment and Plan of Treatment: - Continue to take your b/p medications as prescribed  - Amlodipine & Losartan  - Maintain Low salt diet  - Engage Activity as tolerated.  - Take all medication as prescribed.  - Follow up with your medical doctor for routine blood pressure monitoring at your next visit.  - Notify your doctor if you have any of the following symptoms:   Dizziness, Lightheadedness, Blurry vision, Headache, Chest pain, Shortness of breath      Diagnosis: Hyperlipidemia  Assessment and Plan of Treatment: - Continue to take your cholesterole medications as prescribed  - Atorvastatin 20mg dialy

## 2024-02-15 NOTE — DISCHARGE NOTE PROVIDER - PROVIDER TOKENS
PROVIDER:[TOKEN:[[id number]],FOLLOWUP:[1 week],ESTABLISHEDPATIENT:[T]] PROVIDER:[TOKEN:[[id number]],FOLLOWUP:[1 week],ESTABLISHEDPATIENT:[T]],PROVIDER:[TOKEN:[[id number]],FOLLOWUP:[1 week]]

## 2024-02-15 NOTE — DISCHARGE NOTE PROVIDER - CARE PROVIDER_API CALL
[place] [address]  Phone: [phone]  Fax: [phone]  Established Patient  Follow Up Time: 1 week   [facility]  [address] [place] [address]  Phone: [phone]  Fax: [phone]  Established Patient  Follow Up Time: 1 week    Radha Mueller  Endocrinology/Metab/Diabetes  [address] [place] [id number]  Phone: [phone]  Fax: [phone]  Follow Up Time: 1 week

## 2024-02-15 NOTE — PROGRESS NOTE ADULT - PROBLEM SELECTOR PLAN 2
- Patient takes metformin, glipizide, and Tradjenta at home.   - Hold oral hypoglycemics.   - Start SSI.   - C/W Lantus 6U & Admelog 2U TID  -  A1C. 11.4  - Endo Dr. Mueller consulted  - Endo Recs: Adjust meds upon d/c, pt amenable to basal/bolus insulin. No longer wants to take oral agents Prednisone Counseling:  I discussed with the patient the risks of prolonged use of prednisone including but not limited to weight gain, insomnia, osteoporosis, mood changes, diabetes, susceptibility to infection, glaucoma and high blood pressure.  In cases where prednisone use is prolonged, patients should be monitored with blood pressure checks, serum glucose levels and an eye exam.  Additionally, the patient may need to be placed on GI prophylaxis, PCP prophylaxis, and calcium and vitamin D supplementation and/or a bisphosphonate.  The patient verbalized understanding of the proper use and the possible adverse effects of prednisone.  All of the patient's questions and concerns were addressed.

## 2024-02-15 NOTE — PROGRESS NOTE ADULT - ASSESSMENT
57 y/o male with uncontrolled DM w/ R anterior thigh wound, s/p bedside debridement 2/14  VSS, no leukocytosis    Plan   - IV antibiotics, follow ID recs  - local wound care, daily dressing changes  - strict glucose control   - remainder of care by primary team   - signed out to carolina MEJIA [phone]

## 2024-02-15 NOTE — PROGRESS NOTE ADULT - SUBJECTIVE AND OBJECTIVE BOX
Interval Events:  Fasting sugars well controlled    Allergies    No Known Allergies    Intolerances      Endocrine/Metabolic Medications:  atorvastatin 20 milliGRAM(s) Oral at bedtime  insulin glargine Injectable (LANTUS) 6 Unit(s) SubCutaneous at bedtime  insulin lispro (ADMELOG) corrective regimen sliding scale   SubCutaneous Before meals and at bedtime  insulin lispro Injectable (ADMELOG) 2 Unit(s) SubCutaneous three times a day before meals      Vital Signs Last 24 Hrs  T(C): 36.4 (15 Feb 2024 05:36), Max: 36.5 (14 Feb 2024 21:01)  T(F): 97.6 (15 Feb 2024 05:36), Max: 97.7 (14 Feb 2024 21:01)  HR: 64 (15 Feb 2024 05:36) (64 - 83)  BP: 130/75 (15 Feb 2024 05:36) (113/72 - 130/82)  BP(mean): --  RR: 18 (15 Feb 2024 05:36) (17 - 18)  SpO2: 98% (15 Feb 2024 05:36) (94% - 98%)    Parameters below as of 15 Feb 2024 05:36  Patient On (Oxygen Delivery Method): room air          PHYSICAL EXAM  All physical exam findings normal, except those marked:  General:	Alert, active, cooperative, NAD, well hydrated  .		[] Abnormal:  Neck		Normal: supple, no cervical adenopathy, no palpable thyroid  .		[] Abnormal:  Cardiovascular	Normal: regular rate, normal S1, S2, no murmurs  .		[] Abnormal:  Respiratory	Normal: no chest wall deformity, normal respiratory pattern, CTA B/L  .		[] Abnormal:  Abdominal	Normal: soft, ND, NT, bowel sounds present, no masses, no organomegaly  .		[] Abnormal:  		Normal normal genitalia, testes descended, circumcised/uncircumcised  .		Ange stage:			Breast aneg:  .		Menstrual history:  .		[] Abnormal:  Extremities	Normal: FROM x4  .		[] Abnormal:  Skin		Normal: intact and not indurated, no rash, no acanthosis nigricans  .		[] Abnormal:  Neurologic	Normal: grossly intact  .		[] Abnormal:    LABS                        14.3   5.23  )-----------( 248      ( 15 Feb 2024 08:39 )             42.9                               135    |  103    |  19                  Calcium: 9.3   / iCa: x      (02-15 @ 08:39)    ----------------------------<  184       Magnesium: x                                4.0     |  26     |  1.07             Phosphorous: x          CAPILLARY BLOOD GLUCOSE      POCT Blood Glucose.: 149 mg/dL (15 Feb 2024 07:57)  POCT Blood Glucose.: 247 mg/dL (14 Feb 2024 21:18)  POCT Blood Glucose.: 163 mg/dL (14 Feb 2024 16:58)  POCT Blood Glucose.: 169 mg/dL (14 Feb 2024 11:34)        Assesment/plan    Patient is a 57 y/o M with PMH of DM, HTN, HLD, osteomyelitis s/p right ray amputation (7 months ago), alcohol use disorder who presented with right anterior thigh wound for 3 weeks.  Pt presents with uncontrolled diabetes for which he takes multiple oral agents at home. Pt now has infected wound on his right thigh.       Problem/Recommendation - 1:  ·  Problem: Diabetes mellitus.   ·  Recommendation: Pt takes multiple oral agents at home including metformin, Glipizide, and Tradjenta  A1C: 11.4  Inpatient Recs:  cont Lantus 6  cont Lispro 2 premeal  cont Insulin Sliding Scale  fsg ac and hs  nutrition eval  adjust meds upon d/c, pt amenable to basal/bolus insulin. No longer wants to take oral agents     Problem/Recommendation - 2:  ·  Problem: Cellulitis of right thigh.   ·  Recommendation: Bedside debridement performed by surgery  continue local wound care and antibiotics.   Interval Events:  Fasting sugars well controlled    Allergies    No Known Allergies    Intolerances      Endocrine/Metabolic Medications:  atorvastatin 20 milliGRAM(s) Oral at bedtime  insulin glargine Injectable (LANTUS) 6 Unit(s) SubCutaneous at bedtime  insulin lispro (ADMELOG) corrective regimen sliding scale   SubCutaneous Before meals and at bedtime  insulin lispro Injectable (ADMELOG) 2 Unit(s) SubCutaneous three times a day before meals      Vital Signs Last 24 Hrs  T(C): 36.4 (15 Feb 2024 05:36), Max: 36.5 (14 Feb 2024 21:01)  T(F): 97.6 (15 Feb 2024 05:36), Max: 97.7 (14 Feb 2024 21:01)  HR: 64 (15 Feb 2024 05:36) (64 - 83)  BP: 130/75 (15 Feb 2024 05:36) (113/72 - 130/82)  BP(mean): --  RR: 18 (15 Feb 2024 05:36) (17 - 18)  SpO2: 98% (15 Feb 2024 05:36) (94% - 98%)    Parameters below as of 15 Feb 2024 05:36  Patient On (Oxygen Delivery Method): room air          PHYSICAL EXAM  All physical exam findings normal, except those marked:  General:	Alert, active, cooperative, NAD, well hydrated  .		[] Abnormal:  Neck		Normal: supple, no cervical adenopathy, no palpable thyroid  .		[] Abnormal:  Cardiovascular	Normal: regular rate, normal S1, S2, no murmurs  .		[] Abnormal:  Respiratory	Normal: no chest wall deformity, normal respiratory pattern, CTA B/L  .		[] Abnormal:  Abdominal	Normal: soft, ND, NT, bowel sounds present, no masses, no organomegaly  .		[] Abnormal:  		Normal normal genitalia, testes descended, circumcised/uncircumcised  .		Ange stage:			Breast ange:  .		Menstrual history:  .		[] Abnormal:  Extremities	Normal: FROM x4  .		[] Abnormal:  Skin		Normal: intact and not indurated, no rash, no acanthosis nigricans  .		[] Abnormal:  Neurologic	Normal: grossly intact  .		[] Abnormal:    LABS                        14.3   5.23  )-----------( 248      ( 15 Feb 2024 08:39 )             42.9                               135    |  103    |  19                  Calcium: 9.3   / iCa: x      (02-15 @ 08:39)    ----------------------------<  184       Magnesium: x                                4.0     |  26     |  1.07             Phosphorous: x          CAPILLARY BLOOD GLUCOSE      POCT Blood Glucose.: 149 mg/dL (15 Feb 2024 07:57)  POCT Blood Glucose.: 247 mg/dL (14 Feb 2024 21:18)  POCT Blood Glucose.: 163 mg/dL (14 Feb 2024 16:58)  POCT Blood Glucose.: 169 mg/dL (14 Feb 2024 11:34)        Assesment/plan    Patient is a 57 y/o M with PMH of DM, HTN, HLD, osteomyelitis s/p right ray amputation (7 months ago), alcohol use disorder who presented with right anterior thigh wound for 3 weeks.  Pt presents with uncontrolled diabetes for which he takes multiple oral agents at home. Pt now has infected wound on his right thigh.       Problem/Recommendation - 1:  ·  Problem: Diabetes mellitus.   ·  Recommendation: Pt takes multiple oral agents at home including metformin, Glipizide, and Tradjenta  A1C: 11.4  Inpatient Recs:  cont Lantus 6  cont Lispro 2 premeal  cont Insulin Sliding Scale  fsg ac and hs  nutrition eval  adjust meds upon d/c, pt amenable to basal/bolus insulin. No longer wants to take oral agent  dm teaching       Problem/Recommendation - 2:  ·  Problem: Cellulitis of right thigh.   ·  Recommendation: Bedside debridement performed by surgery  continue local wound care and antibiotics.

## 2024-02-15 NOTE — DISCHARGE NOTE PROVIDER - NSDCMRMEDTOKEN_GEN_ALL_CORE_FT
amLODIPine 10 mg oral tablet: 1 tab(s) orally once a day  atorvastatin 20 mg oral tablet: 1 tab(s) orally once a day (at bedtime)  gabapentin 100 mg oral capsule: 1 cap(s) orally 3 times a day  glipiZIDE 10 mg oral tablet: 1 tab(s) orally once a day  losartan 100 mg oral tablet: 1 tab(s) orally once a day  metFORMIN 1000 mg oral tablet: 1 tab(s) orally 2 times a day  Tradjenta 5 mg oral tablet: 1 tab(s) orally once a day   4 mm insulin needles: attach it to the insulin syringes  Admelog SoloStar 100 units/mL injectable solution: 4 unit(s) injectable 3 times a day (before meals)  amLODIPine 10 mg oral tablet: 1 tab(s) orally once a day  amoxicillin-clavulanate 875 mg-125 mg oral tablet: 875 milligram(s) orally 2 times a day  atorvastatin 20 mg oral tablet: 1 tab(s) orally once a day (at bedtime)  gabapentin 100 mg oral capsule: 1 cap(s) orally 3 times a day  Insulin Glargine Solostar Pen 100 units/mL subcutaneous solution: 10 unit(s) subcutaneous once a day (at bedtime)  losartan 100 mg oral tablet: 1 tab(s) orally once a day  Rubbing Alcohol Wipes 70% topical pad: Apply topically to affected area 3 times a day   4 mm insulin needles: attach it to the insulin syringes  Admelog SoloStar 100 units/mL injectable solution: 4 unit(s) injectable 3 times a day (before meals)  amLODIPine 10 mg oral tablet: 1 tab(s) orally once a day  amoxicillin-clavulanate 875 mg-125 mg oral tablet: 875 milligram(s) orally 2 times a day  atorvastatin 20 mg oral tablet: 1 tab(s) orally once a day (at bedtime)  collagenase 250 units/g topical ointment: Apply topically to affected area once a day  gabapentin 100 mg oral capsule: 1 cap(s) orally 3 times a day  Insulin Glargine Solostar Pen 100 units/mL subcutaneous solution: 10 unit(s) subcutaneous once a day (at bedtime)  losartan 100 mg oral tablet: 1 tab(s) orally once a day  Rubbing Alcohol Wipes 70% topical pad: Apply topically to affected area 3 times a day

## 2024-02-15 NOTE — DISCHARGE NOTE PROVIDER - HOSPITAL COURSE
Patient is a 57 y/o M with PMH of DM, HTN, HLD, osteomyelitis s/p right ray amputation (7 months ago), alcohol use disorder who presented with right anterior thigh wound for 3 weeks. CT < from: CT Lower Extremity w/ IV Cont, Right Findings of cellulitis/phlegmon change about the anterolateral mid thigh tissues. No drainable fluid collection and no evidence of a deeper infection. ANDIE wnl.   Patient is being admitted for management of right anterior thigh infected wound and surrounding cellulitis. CRISTAL Gutierres consulted. Started on Unasyn.  S/P bedside debridement of right thigh wound  2/14 by surgery.   Patient also noted with elevated A1c of 11.4. Endocrine Dr. Mueller consulted, insulin regimen adjusted.    Endo Recs: Adjust meds upon d/c, pt amenable to basal/bolus insulin. No longer wants to take oral agents.    Given patient's improved clinical status and current hemodynamic stability, decision was made to discharge.  Please refer to patient's complete medical chart with documents for a full hospital course, for this is only a brief summary.

## 2024-02-15 NOTE — PROGRESS NOTE ADULT - PROBLEM SELECTOR PLAN 1
- P/w right anterior thigh wound with purulent discharge.    - CT LE - Findings of cellulitis/phlegmon change about the anterolateral mid thigh tissues. No drainable fluid collection and no evidence of a deeper infection.  - ESR - 66. Bcx: NGTD  - S/p cefepime and vancomycin in ED.   - C/W unasyn   - S/P bedside debridement 2/14  - F/U wound swab Cx  - ANDIE: Normal  - Consulted ID - Dr. Gutierres.   - Surgery following

## 2024-02-15 NOTE — PROGRESS NOTE ADULT - SUBJECTIVE AND OBJECTIVE BOX
INTERVAL HPI/OVERNIGHT EVENTS:    Pt seen and examined at bedside. No overnight events. Pt admits to some discomfort in right thigh at wound site. Denies numbness tingling in right lower extremity.     Vital Signs Last 24 Hrs  T(C): 36.5 (15 Feb 2024 12:46), Max: 36.5 (14 Feb 2024 21:01)  T(F): 97.7 (15 Feb 2024 12:46), Max: 97.7 (14 Feb 2024 21:01)  HR: 82 (15 Feb 2024 12:46) (64 - 82)  BP: 110/75 (15 Feb 2024 12:46) (110/75 - 130/75)  BP(mean): --  RR: 17 (15 Feb 2024 12:46) (17 - 18)  SpO2: 99% (15 Feb 2024 12:46) (94% - 99%)    Parameters below as of 15 Feb 2024 12:46  Patient On (Oxygen Delivery Method): room air      I&O's Detail    ampicillin/sulbactam  IVPB 3 Gram(s) IV Intermittent every 6 hours      Physical Exam  General: AAOx3, No acute distress  Extremities: R chronic thigh wound. around 3c, by 3cm with clean wound edges, no purulent discharge. dressing changed at bedside.       Drains/Tubes:     Labs:                        14.3   5.23  )-----------( 248      ( 15 Feb 2024 08:39 )             42.9     02-15    135  |  103  |  19<H>  ----------------------------<  184<H>  4.0   |  26  |  1.07    Ca    9.3      15 Feb 2024 08:39  Phos  3.4     02-14  Mg     1.7     02-14    TPro  7.7  /  Alb  2.8<L>  /  TBili  0.5  /  DBili  x   /  AST  30  /  ALT  30  /  AlkPhos  54  02-14        RADIOLOGY & ADDITIONAL STUDIES:    58yMale

## 2024-02-15 NOTE — DISCHARGE NOTE PROVIDER - ATTENDING DISCHARGE PHYSICAL EXAMINATION:
Patient is a 59 y/o M with PMHx of DM, HTN, HLD, osteomyelitis s/p right ray amputation (7 months ago), and alcohol use disorder who presented with right anterior thigh wound x 3 weeks. Admitted for uncontrolled DM and R thigh cellulitis.    ASSESSMENT & PLAN   # R Anterior Thigh Cellulitis   # Uncontrolled DM   # HTN   # HLD   # Hx of Osteomyelitis s/p Ray Amputation     Pt received insulin teaching but reports that he will not wait for wound care teaching/ home wound care set up   Stressed importance of follow up and good compliance to prevent recurrence   R anterior thigh wound is improving and patient is instructed to wash, apply Santyl, moist occlusive dressing and cover with gauze.   Stop all home OHAs, c/w new insulin basal bolus regimen as prescribed   C/w Augmentin x 11 days to finish course.    PHYSICAL EXAMINATION:  GENERAL: NAD, well built  HEAD:  Atraumatic, Normocephalic  EYES:  conjunctiva and sclera clear  NECK: Supple, No JVD, Normal thyroid  CHEST/LUNG: Clear to auscultation. Clear to percussion bilaterally; No rales, rhonchi, wheezing, or rubs  HEART: Regular rate and rhythm; No murmurs, rubs, or gallops  ABDOMEN: Soft, Nontender, Nondistended; Bowel sounds present  NERVOUS SYSTEM:  Alert & Oriented X3, no motor deficit   EXTREMITIES:  2+ Peripheral Pulses, No clubbing, cyanosis, or edema  SKIN: R anterolateral thigh wound; wrapped in gauze and taped.

## 2024-02-15 NOTE — PROGRESS NOTE ADULT - SUBJECTIVE AND OBJECTIVE BOX
NP Note discussed with  primary attending    Patient is a 58y old  Male who presents with a chief complaint of Right anterior thigh wound. (15 Feb 2024 13:47)      INTERVAL HPI/OVERNIGHT EVENTS: no new complaints    MEDICATIONS  (STANDING):  amLODIPine   Tablet 10 milliGRAM(s) Oral daily  ampicillin/sulbactam  IVPB 3 Gram(s) IV Intermittent every 6 hours  atorvastatin 20 milliGRAM(s) Oral at bedtime  enoxaparin Injectable 40 milliGRAM(s) SubCutaneous every 24 hours  gabapentin 100 milliGRAM(s) Oral three times a day  insulin glargine Injectable (LANTUS) 6 Unit(s) SubCutaneous at bedtime  insulin lispro (ADMELOG) corrective regimen sliding scale   SubCutaneous Before meals and at bedtime  insulin lispro Injectable (ADMELOG) 2 Unit(s) SubCutaneous three times a day before meals  losartan 100 milliGRAM(s) Oral daily    MEDICATIONS  (PRN):  acetaminophen     Tablet .. 650 milliGRAM(s) Oral every 6 hours PRN Temp greater or equal to 38C (100.4F), Mild Pain (1 - 3)  melatonin 3 milliGRAM(s) Oral at bedtime PRN Insomnia  ondansetron Injectable 4 milliGRAM(s) IV Push every 8 hours PRN Nausea and/or Vomiting      __________________________________________________  REVIEW OF SYSTEMS:    CONSTITUTIONAL: No fever,   EYES: no acute visual disturbances  NECK: No pain or stiffness  RESPIRATORY: No cough; No shortness of breath  CARDIOVASCULAR: No chest pain, no palpitations  GASTROINTESTINAL: No pain. No nausea or vomiting; No diarrhea   NEUROLOGICAL: No headache or numbness, no tremors  MUSCULOSKELETAL: No joint pain, no muscle pain  GENITOURINARY: no dysuria, no frequency, no hesitancy  PSYCHIATRY: no depression , no anxiety  ALL OTHER  ROS negative        Vital Signs Last 24 Hrs  T(C): 36.5 (15 Feb 2024 12:46), Max: 36.5 (14 Feb 2024 21:01)  T(F): 97.7 (15 Feb 2024 12:46), Max: 97.7 (14 Feb 2024 21:01)  HR: 82 (15 Feb 2024 12:46) (64 - 82)  BP: 110/75 (15 Feb 2024 12:46) (110/75 - 130/75)  BP(mean): --  RR: 17 (15 Feb 2024 12:46) (17 - 18)  SpO2: 99% (15 Feb 2024 12:46) (94% - 99%)    Parameters below as of 15 Feb 2024 12:46  Patient On (Oxygen Delivery Method): room air        ________________________________________________  PHYSICAL EXAM:  GENERAL: NAD  HEENT: Normocephalic;  conjunctivae and sclerae clear; moist mucous membranes;   NECK : supple  CHEST/LUNG: Clear to ausculitation bilaterally with good air entry   HEART: S1 S2  regular; no murmurs, gallops or rubs  ABDOMEN: Soft, Nontender, Nondistended; Bowel sounds present  EXTREMITIES: Right upper wound dressing C/D/I  SKIN: warm and dry; no rash  NERVOUS SYSTEM:  Awake and alert; Oriented  to place, person and time ; no new deficits    _________________________________________________  LABS:                        14.3   5.23  )-----------( 248      ( 15 Feb 2024 08:39 )             42.9     02-15    135  |  103  |  19<H>  ----------------------------<  184<H>  4.0   |  26  |  1.07    Ca    9.3      15 Feb 2024 08:39  Phos  3.4     02-14  Mg     1.7     02-14    TPro  7.7  /  Alb  2.8<L>  /  TBili  0.5  /  DBili  x   /  AST  30  /  ALT  30  /  AlkPhos  54  02-14      Urinalysis Basic - ( 15 Feb 2024 08:39 )    Color: x / Appearance: x / SG: x / pH: x  Gluc: 184 mg/dL / Ketone: x  / Bili: x / Urobili: x   Blood: x / Protein: x / Nitrite: x   Leuk Esterase: x / RBC: x / WBC x   Sq Epi: x / Non Sq Epi: x / Bacteria: x      CAPILLARY BLOOD GLUCOSE      POCT Blood Glucose.: 324 mg/dL (15 Feb 2024 11:43)  POCT Blood Glucose.: 149 mg/dL (15 Feb 2024 07:57)  POCT Blood Glucose.: 247 mg/dL (14 Feb 2024 21:18)  POCT Blood Glucose.: 163 mg/dL (14 Feb 2024 16:58)        RADIOLOGY & ADDITIONAL TESTS:  < from: CT Lower Extremity w/ IV Cont, Right (02.12.24 @ 18:06) >    IMPRESSION:  Findings of cellulitis/phlegmon change about the anterolateral mid thigh   tissues. No drainable fluid collection and no evidence of a deeper   infection.      < end of copied text >    Imaging Personally Reviewed:  YES  Consultant(s) Notes Reviewed:   YES    Care Discussed with Consultants :     Plan of care was discussed with patient and /or primary care giver; all questions and concerns were addressed and care was aligned with patient's wishes.

## 2024-02-15 NOTE — PROGRESS NOTE ADULT - ASSESSMENT
Subjective: NAD, afebrile, no N/V or diarrhea, R thigh wound debrided by surgery pt clinically stable.     REVIEW OF SYSTEMS:  CONSTITUTIONAL:  No fevers or chills  EYES/ENT:  No visual changes; no throat pain   NECK:  No neck pain or stiffness  RESPIRATORY:  No cough, no wheezing. No shortness of breath  CARDIOVASCULAR:  No chest pain or palpitations  GASTROINTESTINAL:  No abdominal pain. No N/V or diarrhea  GENITOURINARY:  No dysuria, frequency or hematuria  NEUROLOGICAL:  No numbness or weakness  MSK: no back pain, R thigh wound  SKIN:  No itching, no skin rash    PE:  Vital Signs Last 24 Hrs  T(C): 36.5 (15 Feb 2024 12:46), Max: 36.5 (14 Feb 2024 21:01)  T(F): 97.7 (15 Feb 2024 12:46), Max: 97.7 (14 Feb 2024 21:01)  HR: 82 (15 Feb 2024 12:46) (64 - 82)  BP: 110/75 (15 Feb 2024 12:46) (110/75 - 130/75)  RR: 17 (15 Feb 2024 12:46) (17 - 18)  SpO2: 99% (15 Feb 2024 12:46) (94% - 99%)    Parameters below as of 15 Feb 2024 12:46  Patient On (Oxygen Delivery Method): room air    Gen: AOx3, NAD, non-toxic, pleasant  HEAD:  Atraumatic  EYES: PERRLA, conjunctiva and sclera clear  ENT: Moist mucous membranes  NECK: Supple, No JVD  CV: S1+S2 normal, no murmurs  Resp: Clear bilat, no resp distress, no crackles/wheezes  Abd: Soft, nontender, +BS  Ext: No LE edema, no cyanosis, pulses +  : No dysuria  IV/Skin: No thrombophlebitis, R thigh wound ~3 cm debrided base is pink and clear now, no significant periwound edema  Msk: No low back pain, no arthralgias, no joint swelling  Neuro: AAOx3. No focal signs     LABS/DIAGNOSTIC TESTS:                        14.3   5.23  )-----------( 248      ( 15 Feb 2024 08:39 )             42.9     WBC Count: 5.23 K/uL (02-15 @ 08:39)  WBC Count: 5.35 K/uL (02-14 @ 08:00)  WBC Count: 6.26 K/uL (02-13 @ 05:20)  WBC Count: 6.59 K/uL (02-12 @ 15:20)    02-15    135  |  103  |  19<H>  ----------------------------<  184<H>  4.0   |  26  |  1.07    Ca    9.3      15 Feb 2024 08:39  Phos  3.4     02-14  Mg     1.7     02-14    TPro  7.7  /  Alb  2.8<L>  /  TBili  0.5  /  DBili  x   /  AST  30  /  ALT  30  /  AlkPhos  54  02-14    Sedimentation Rate, Erythrocyte: 66 mm/Hr *H* (02-12-24 @ 15:20)  C-Reactive Protein, Serum: 3 mg/L (02-12-24 @ 15:20)    CULTURES:   Culture - Blood (collected 02-12-24 @ 15:30)  Source: .Blood Blood-Peripheral  Preliminary Report (02-14-24 @ 22:01):    No growth at 48 Hours    Culture - Blood (collected 02-12-24 @ 15:20)  Source: .Blood Blood-Peripheral  Preliminary Report (02-14-24 @ 22:01):    No growth at 48 Hours    RADIOLOGY:   Available pertinent Imaging reviewed    ANTIBIOTICS:  ampicillin/sulbactam  IVPB 3 every 6 hours    IMPRESSION:  57 y/o M with PMH of DM, HTN, HLD, osteomyelitis s/p right ray amputation (7 months ago), alcohol use disorder admitted for R thigh infected wound cellulitis and uncontrolled DM.   No leukocytosis, no significant elevation of inflammatory markers, no recent hospitalization or abx use.   CT RLE reviewed with evidence of cellulitis/phlegmon.   A1C with Estimated Average Glucose Result: 11.4 (02.12.24 @ 15:20)  S/P wound debridement with surgery 2/14    -SSTI- R thigh infected ulcer/cellulitis  -Uncontrolled diabetes mellitus  -Alcohol abuse     PLAN:  Continue Unasyn 3g q6 hrs IV  Follow up cx  DM control  Wound care    Please reach ID with any questions or concerns  Dr. Emani Multnai  Available in Teams

## 2024-02-15 NOTE — PROGRESS NOTE ADULT - ASSESSMENT
Patient is a 57 y/o M with PMH of DM, HTN, HLD, osteomyelitis s/p right ray amputation (7 months ago), alcohol use disorder who presented with right anterior thigh wound for 3 weeks. CT < from: CT Lower Extremity w/ IV Cont, Right Findings of cellulitis/phlegmon change about the anterolateral mid thigh tissues. No drainable fluid collection and no evidence of a deeper infection.  Patient is being admitted for management of right anterior thigh infected wound and surrounding cellulitis. CRISTAL Gutierres consulted. Started on Unasyn.  S/P bedside debridement of right thigh wound  2/14 by surgery.

## 2024-02-16 LAB
-  AMPICILLIN/SULBACTAM: SIGNIFICANT CHANGE UP
-  AMPICILLIN/SULBACTAM: SIGNIFICANT CHANGE UP
-  CEFAZOLIN: SIGNIFICANT CHANGE UP
-  CEFAZOLIN: SIGNIFICANT CHANGE UP
-  CLINDAMYCIN: SIGNIFICANT CHANGE UP
-  CLINDAMYCIN: SIGNIFICANT CHANGE UP
-  ERYTHROMYCIN: SIGNIFICANT CHANGE UP
-  ERYTHROMYCIN: SIGNIFICANT CHANGE UP
-  GENTAMICIN: SIGNIFICANT CHANGE UP
-  GENTAMICIN: SIGNIFICANT CHANGE UP
-  OXACILLIN: SIGNIFICANT CHANGE UP
-  OXACILLIN: SIGNIFICANT CHANGE UP
-  PENICILLIN: SIGNIFICANT CHANGE UP
-  PENICILLIN: SIGNIFICANT CHANGE UP
-  RIFAMPIN: SIGNIFICANT CHANGE UP
-  RIFAMPIN: SIGNIFICANT CHANGE UP
-  TETRACYCLINE: SIGNIFICANT CHANGE UP
-  TETRACYCLINE: SIGNIFICANT CHANGE UP
-  TRIMETHOPRIM/SULFAMETHOXAZOLE: SIGNIFICANT CHANGE UP
-  TRIMETHOPRIM/SULFAMETHOXAZOLE: SIGNIFICANT CHANGE UP
-  VANCOMYCIN: SIGNIFICANT CHANGE UP
-  VANCOMYCIN: SIGNIFICANT CHANGE UP
ANION GAP SERPL CALC-SCNC: 7 MMOL/L — SIGNIFICANT CHANGE UP (ref 5–17)
BUN SERPL-MCNC: 16 MG/DL — SIGNIFICANT CHANGE UP (ref 7–18)
CALCIUM SERPL-MCNC: 9.6 MG/DL — SIGNIFICANT CHANGE UP (ref 8.4–10.5)
CHLORIDE SERPL-SCNC: 105 MMOL/L — SIGNIFICANT CHANGE UP (ref 96–108)
CO2 SERPL-SCNC: 22 MMOL/L — SIGNIFICANT CHANGE UP (ref 22–31)
CREAT SERPL-MCNC: 1.01 MG/DL — SIGNIFICANT CHANGE UP (ref 0.5–1.3)
CULTURE RESULTS: ABNORMAL
EGFR: 85 ML/MIN/1.73M2 — SIGNIFICANT CHANGE UP
GLUCOSE BLDC GLUCOMTR-MCNC: 150 MG/DL — HIGH (ref 70–99)
GLUCOSE BLDC GLUCOMTR-MCNC: 214 MG/DL — HIGH (ref 70–99)
GLUCOSE BLDC GLUCOMTR-MCNC: 236 MG/DL — HIGH (ref 70–99)
GLUCOSE BLDC GLUCOMTR-MCNC: 247 MG/DL — HIGH (ref 70–99)
GLUCOSE BLDC GLUCOMTR-MCNC: 262 MG/DL — HIGH (ref 70–99)
GLUCOSE SERPL-MCNC: 171 MG/DL — HIGH (ref 70–99)
HCT VFR BLD CALC: 41.6 % — SIGNIFICANT CHANGE UP (ref 39–50)
HGB BLD-MCNC: 14.1 G/DL — SIGNIFICANT CHANGE UP (ref 13–17)
MCHC RBC-ENTMCNC: 31.1 PG — SIGNIFICANT CHANGE UP (ref 27–34)
MCHC RBC-ENTMCNC: 33.9 GM/DL — SIGNIFICANT CHANGE UP (ref 32–36)
MCV RBC AUTO: 91.6 FL — SIGNIFICANT CHANGE UP (ref 80–100)
METHOD TYPE: SIGNIFICANT CHANGE UP
METHOD TYPE: SIGNIFICANT CHANGE UP
NRBC # BLD: 0 /100 WBCS — SIGNIFICANT CHANGE UP (ref 0–0)
ORGANISM # SPEC MICROSCOPIC CNT: ABNORMAL
ORGANISM # SPEC MICROSCOPIC CNT: ABNORMAL
PLATELET # BLD AUTO: 241 K/UL — SIGNIFICANT CHANGE UP (ref 150–400)
POTASSIUM SERPL-MCNC: 3.8 MMOL/L — SIGNIFICANT CHANGE UP (ref 3.5–5.3)
POTASSIUM SERPL-SCNC: 3.8 MMOL/L — SIGNIFICANT CHANGE UP (ref 3.5–5.3)
RBC # BLD: 4.54 M/UL — SIGNIFICANT CHANGE UP (ref 4.2–5.8)
RBC # FLD: 12.4 % — SIGNIFICANT CHANGE UP (ref 10.3–14.5)
SODIUM SERPL-SCNC: 134 MMOL/L — LOW (ref 135–145)
SPECIMEN SOURCE: SIGNIFICANT CHANGE UP
WBC # BLD: 5.36 K/UL — SIGNIFICANT CHANGE UP (ref 3.8–10.5)
WBC # FLD AUTO: 5.36 K/UL — SIGNIFICANT CHANGE UP (ref 3.8–10.5)

## 2024-02-16 PROCEDURE — 99232 SBSQ HOSP IP/OBS MODERATE 35: CPT

## 2024-02-16 RX ORDER — INSULIN GLARGINE 100 [IU]/ML
10 INJECTION, SOLUTION SUBCUTANEOUS AT BEDTIME
Refills: 0 | Status: DISCONTINUED | OUTPATIENT
Start: 2024-02-16 | End: 2024-02-17

## 2024-02-16 RX ORDER — INSULIN LISPRO 100/ML
4 VIAL (ML) SUBCUTANEOUS
Refills: 0 | Status: DISCONTINUED | OUTPATIENT
Start: 2024-02-16 | End: 2024-02-17

## 2024-02-16 RX ADMIN — LOSARTAN POTASSIUM 100 MILLIGRAM(S): 100 TABLET, FILM COATED ORAL at 09:50

## 2024-02-16 RX ADMIN — ATORVASTATIN CALCIUM 20 MILLIGRAM(S): 80 TABLET, FILM COATED ORAL at 22:23

## 2024-02-16 RX ADMIN — INSULIN GLARGINE 10 UNIT(S): 100 INJECTION, SOLUTION SUBCUTANEOUS at 22:28

## 2024-02-16 RX ADMIN — Medication 2: at 22:29

## 2024-02-16 RX ADMIN — GABAPENTIN 100 MILLIGRAM(S): 400 CAPSULE ORAL at 06:59

## 2024-02-16 RX ADMIN — AMLODIPINE BESYLATE 10 MILLIGRAM(S): 2.5 TABLET ORAL at 09:50

## 2024-02-16 RX ADMIN — Medication 4 UNIT(S): at 17:13

## 2024-02-16 RX ADMIN — AMPICILLIN SODIUM AND SULBACTAM SODIUM 200 GRAM(S): 250; 125 INJECTION, POWDER, FOR SUSPENSION INTRAMUSCULAR; INTRAVENOUS at 11:40

## 2024-02-16 RX ADMIN — Medication 2 UNIT(S): at 08:29

## 2024-02-16 RX ADMIN — AMPICILLIN SODIUM AND SULBACTAM SODIUM 200 GRAM(S): 250; 125 INJECTION, POWDER, FOR SUSPENSION INTRAMUSCULAR; INTRAVENOUS at 00:00

## 2024-02-16 RX ADMIN — Medication 2 UNIT(S): at 11:38

## 2024-02-16 RX ADMIN — AMPICILLIN SODIUM AND SULBACTAM SODIUM 200 GRAM(S): 250; 125 INJECTION, POWDER, FOR SUSPENSION INTRAMUSCULAR; INTRAVENOUS at 17:14

## 2024-02-16 RX ADMIN — Medication 2: at 17:16

## 2024-02-16 RX ADMIN — GABAPENTIN 100 MILLIGRAM(S): 400 CAPSULE ORAL at 14:37

## 2024-02-16 RX ADMIN — AMPICILLIN SODIUM AND SULBACTAM SODIUM 200 GRAM(S): 250; 125 INJECTION, POWDER, FOR SUSPENSION INTRAMUSCULAR; INTRAVENOUS at 06:59

## 2024-02-16 RX ADMIN — ENOXAPARIN SODIUM 40 MILLIGRAM(S): 100 INJECTION SUBCUTANEOUS at 00:00

## 2024-02-16 RX ADMIN — GABAPENTIN 100 MILLIGRAM(S): 400 CAPSULE ORAL at 22:23

## 2024-02-16 RX ADMIN — Medication 2: at 11:39

## 2024-02-16 NOTE — DIETITIAN INITIAL EVALUATION ADULT - REASON INDICATOR FOR ASSESSMENT
RN re: pressure injury Stage 2 or greater (admit w/ cellulitis/ wound care RN does not identify pressure injury). A1c 11. (2/16)

## 2024-02-16 NOTE — DIETITIAN INITIAL EVALUATION ADULT - OTHER INFO
Pt presents  with right anterior thigh wound for 3 weeks, uncontrolled DM. Pt visited, was under covers w/ cell phone, but did remove covers to talk to RD. He reports he is 5'9, . Pt reports good appetite. Does not report any diet issues PTA. Accepted diet ed/ copy. Discussed w/ NP

## 2024-02-16 NOTE — PROGRESS NOTE ADULT - NS ATTEND AMEND GEN_ALL_CORE FT
Patient seen/evaluated at bedside on 2/16/24. I agree with the resident progress note/outlined plan of care. My independent findings and conclusions are documented.    S: minimal discomfort at site of right thigh wound, no significant drainage. Denies fevers/chills    vitals reviewed  physical exam: AAOx3 NAD  S1S2 RRR  cTAb/l no accessory muscle use  soft, NT, ND, + BS  no LE edema/cyanosis/clubbing    cbc/cmp/coags reviewed    Right thigh- clean based wound with mild surrounding erythema with packing with serosanguinous fluid    #infected thigh wound- cx prelim staph  #thigh cellulitis  #DM T II- uncontrolled  #HTN  #h/o R foot osteomyelitis s/p R ray amputation  #h/o alcohol use disorder    59 y/o M with PMH of DM, HTN, HLD, osteomyelitis s/p right ray amputation (7 months ago), alcohol use disorder who presented with right anterior thigh wound for 3 weeks. CT of R Lower Extremity w/ IV Cont, Right Findings of cellulitis/phlegmon  with no drainable fluid collection and no evidence of a deeper infection. Initially managed with vancomycin then de-escalated to unasyn with ID guidance. Underwent bedside I&D by surgery team on 2/14/24- well tolerated. Found to have HgbA1c of 11.4.     follow up surgical tissue culture- prelim staph aureus  -continue with antimicrobial coverage with unasyn, vancomycin has been off since 2/13/24- continue to monitor wound  Pt no longer want to take oral hypoglycemia  -appreciate surgery and endocrinology ---increase lantus 10 units qhs with 5 units TID with meals  -local wound care per surgery recs with daily wound dressing changes  -refer for home services, patient teaching for wound care and insulin administration

## 2024-02-16 NOTE — PROGRESS NOTE ADULT - PROBLEM SELECTOR PLAN 1
P/w right anterior thigh wound with purulent discharge.    CT RLE - Findings of cellulitis/phlegmon change about the anterolateral mid thigh tissues. No drainable fluid collection and no evidence of a deeper infection.  Bcx: NGTD  S/p cefepime and vancomycin in ED  C/W Unasyn   S/P bedside debridement 2/14  F/U wound swab Cx  ANDIE: Normal  ID Dr. Gutierres following   Surgery following

## 2024-02-16 NOTE — PROGRESS NOTE ADULT - PROBLEM SELECTOR PLAN 2
Patient takes metformin, glipizide, and Tradjenta at home.   Hold oral agents  C/W ISSC   C/W Lantus 6U & Admelog 2U TID  A1C 11.4%  Endo Dr. Mueller following  D/C recs: pt amenable to basal/bolus insulin. No longer wants to take oral agents Patient takes metformin, glipizide, and Tradjenta at home.   Hold oral agents  C/W ISSC   Increase Lantus to 10U & Admelog to 4U TID  A1C 11.4%  Endo Dr. Mueller following  D/C recs: pt amenable to basal/bolus insulin. No longer wants to take oral agents

## 2024-02-16 NOTE — DIETITIAN INITIAL EVALUATION ADULT - PERTINENT LABORATORY DATA
02-16    134<L>  |  105  |  16  ----------------------------<  171<H>  3.8   |  22  |  1.01    Ca    9.6      16 Feb 2024 08:30    POCT Blood Glucose.: 247 mg/dL (02-16-24 @ 11:14)  A1C with Estimated Average Glucose Result: 11.4 % (02-12-24 @ 15:20)

## 2024-02-16 NOTE — PROGRESS NOTE ADULT - PROBLEM SELECTOR PLAN 6
- Pt is from home  - Dispo is pending when surgery clears & clinical improvement  - Endo Recs: Adjust meds upon d/c, pt amenable to basal/bolus insulin. No longer wants to take oral agents
Pt is from home  Dispo is pending final wound cx and ID recs  Wound care recs  Endo Recs: Adjust meds upon d/c, pt amenable to basal/bolus insulin. No longer wants to take oral agents
- Pending surgical intervention: Bedside vs OR debridement
unable to assess

## 2024-02-16 NOTE — PROGRESS NOTE ADULT - ASSESSMENT
Patient is a 59 y/o M with PMH of DM, HTN, HLD, osteomyelitis s/p right ray amputation (7 months ago), alcohol use disorder who presented with right anterior thigh wound for 3 weeks. CT < from: CT Lower Extremity w/ IV Cont, Right Findings of cellulitis/phlegmon change about the anterolateral mid thigh tissues. No drainable fluid collection and no evidence of a deeper infection.  Patient is being admitted for management of right anterior thigh infected wound and surrounding cellulitis.   CRISTAL Gutierres consulted. Started on Unasyn.  S/P bedside debridement of right thigh wound  2/14 by surgery.

## 2024-02-16 NOTE — DIETITIAN INITIAL EVALUATION ADULT - PROBLEM SELECTOR PLAN 1
- P/w right anterior thigh wound with purulent discharge.    - CT LE - Findings of cellulitis/phlegmon change about the anterolateral mid thigh tissues. No drainable fluid collection and no evidence of a deeper infection.  - ESR - 66.   - S/p cefepime and vancomycin in ED.   - Start unasyn and vancomycin.   - F/U CRP   - F/U BCx.   - F/U ANDIE (weak peripheral pulses)  - Consulted ID - Dr. Gutierres.   - Consulted surgery - for possible wound debridement.

## 2024-02-16 NOTE — PROGRESS NOTE ADULT - PROBLEM SELECTOR PLAN 3
C/w amlodipine and losartan.
B/P controlled   C/w amlodipine and losartan  Monitor BP and adjust as needed
C/w amlodipine and losartan.
C/w amlodipine and losartan.

## 2024-02-16 NOTE — PROGRESS NOTE ADULT - SUBJECTIVE AND OBJECTIVE BOX
INTERVAL HPI/OVERNIGHT EVENTS: NAEO. AVSS. Patient seen and examined at bedside.  Denies severe pain of LE, fevers, chills.     Vital Signs Last 24 Hrs  T(C): 36.5 (16 Feb 2024 04:57), Max: 36.6 (15 Feb 2024 21:19)  T(F): 97.7 (16 Feb 2024 04:57), Max: 97.8 (15 Feb 2024 21:19)  HR: 69 (16 Feb 2024 09:45) (67 - 82)  BP: 124/85 (16 Feb 2024 09:45) (110/75 - 136/81)  BP(mean): --  RR: 17 (16 Feb 2024 04:57) (17 - 18)  SpO2: 98% (16 Feb 2024 04:57) (97% - 99%)    Parameters below as of 16 Feb 2024 04:57  Patient On (Oxygen Delivery Method): room air      I&O's Detail    ampicillin/sulbactam  IVPB 3 Gram(s) IV Intermittent every 6 hours      Physical Exam:  General: AAOx3, No acute distress  HEENT: NC/AT, trachea midline  Respiratory: Nonlabored breathing, equal chest rise b/l   Skin: No jaundice, no icterus  Abdomen: soft,  nondistended, nontender  Extremities: non edematous, R anterior thigh with ~3x3cm wound. Wound base pink with granulation tissue, no drainage or bleeding noted. No surrounding erythema or fluctuance appreciated. Dressing changed.   Lines/Drains/Tubes:     Drains/Tubes:       Labs:                        14.1   5.36  )-----------( 241      ( 16 Feb 2024 08:30 )             41.6     02-16    134<L>  |  105  |  16  ----------------------------<  171<H>  3.8   |  22  |  1.01    Ca    9.6      16 Feb 2024 08:30          RADIOLOGY & ADDITIONAL STUDIES:

## 2024-02-16 NOTE — PROGRESS NOTE ADULT - SUBJECTIVE AND OBJECTIVE BOX
Patient is a 58y old  Male who presents with a chief complaint of Right anterior thigh wound. (16 Feb 2024 13:52)      INTERVAL HPI/OVERNIGHT EVENTS: no new complaints    MEDICATIONS  (STANDING):  amLODIPine   Tablet 10 milliGRAM(s) Oral daily  ampicillin/sulbactam  IVPB 3 Gram(s) IV Intermittent every 6 hours  atorvastatin 20 milliGRAM(s) Oral at bedtime  enoxaparin Injectable 40 milliGRAM(s) SubCutaneous every 24 hours  gabapentin 100 milliGRAM(s) Oral three times a day  insulin glargine Injectable (LANTUS) 6 Unit(s) SubCutaneous at bedtime  insulin lispro (ADMELOG) corrective regimen sliding scale   SubCutaneous Before meals and at bedtime  insulin lispro Injectable (ADMELOG) 2 Unit(s) SubCutaneous three times a day before meals  losartan 100 milliGRAM(s) Oral daily    MEDICATIONS  (PRN):  acetaminophen     Tablet .. 650 milliGRAM(s) Oral every 6 hours PRN Temp greater or equal to 38C (100.4F), Mild Pain (1 - 3)  melatonin 3 milliGRAM(s) Oral at bedtime PRN Insomnia  ondansetron Injectable 4 milliGRAM(s) IV Push every 8 hours PRN Nausea and/or Vomiting      __________________________________________________  REVIEW OF SYSTEMS:    CONSTITUTIONAL: No fever,   EYES: no acute visual disturbances  NECK: No pain or stiffness  RESPIRATORY: No cough; No shortness of breath  CARDIOVASCULAR: No chest pain, no palpitations  GASTROINTESTINAL: No pain. No nausea or vomiting; No diarrhea   NEUROLOGICAL: No headache or numbness, no tremors  MUSCULOSKELETAL: No joint pain, no muscle pain  GENITOURINARY: no dysuria, no frequency, no hesitancy  PSYCHIATRY: no depression , no anxiety  ALL OTHER  ROS negative      Vital Signs Last 24 Hrs  T(C): 36.3 (16 Feb 2024 13:58), Max: 36.6 (15 Feb 2024 21:19)  T(F): 97.3 (16 Feb 2024 13:58), Max: 97.8 (15 Feb 2024 21:19)  HR: 79 (16 Feb 2024 13:58) (67 - 79)  BP: 115/78 (16 Feb 2024 13:58) (115/78 - 136/81)  BP(mean): --  RR: 18 (16 Feb 2024 13:58) (17 - 18)  SpO2: 96% (16 Feb 2024 13:58) (96% - 98%)    Parameters below as of 16 Feb 2024 13:58  Patient On (Oxygen Delivery Method): room air        ________________________________________________  PHYSICAL EXAM:  GENERAL: NAD  HEENT: Normocephalic;  conjunctivae and sclerae clear; moist mucous membranes;   NECK : supple  CHEST/LUNG: Clear to auscultation bilaterally with good air entry   HEART: S1 S2  regular; no murmurs, gallops or rubs  ABDOMEN: Soft, Nontender, Nondistended; Bowel sounds present  EXTREMITIES: no cyanosis; no edema; no calf tenderness  SKIN: warm and dry; no rash  NERVOUS SYSTEM:  Awake and alert; Oriented  to place, person and time ; no new deficits    _________________________________________________  LABS:                        14.1   5.36  )-----------( 241      ( 16 Feb 2024 08:30 )             41.6     02-16    134<L>  |  105  |  16  ----------------------------<  171<H>  3.8   |  22  |  1.01    Ca    9.6      16 Feb 2024 08:30        Urinalysis Basic - ( 16 Feb 2024 08:30 )    Color: x / Appearance: x / SG: x / pH: x  Gluc: 171 mg/dL / Ketone: x  / Bili: x / Urobili: x   Blood: x / Protein: x / Nitrite: x   Leuk Esterase: x / RBC: x / WBC x   Sq Epi: x / Non Sq Epi: x / Bacteria: x      CAPILLARY BLOOD GLUCOSE      POCT Blood Glucose.: 247 mg/dL (16 Feb 2024 11:14)  POCT Blood Glucose.: 150 mg/dL (16 Feb 2024 08:00)  POCT Blood Glucose.: 218 mg/dL (15 Feb 2024 21:41)  POCT Blood Glucose.: 192 mg/dL (15 Feb 2024 16:41)      RADIOLOGY & ADDITIONAL TESTS:    Imaging Personally Reviewed:  YES/NO    Consultant(s) Notes Reviewed:   YES/ No    Care Discussed with Consultants :     Plan of care was discussed with patient and /or primary care giver; all questions and concerns were addressed and care was aligned with patient's wishes.       Patient is a 58y old  Male who presents with a chief complaint of Right anterior thigh wound. (16 Feb 2024 13:52)      INTERVAL HPI/OVERNIGHT EVENTS: pt seen at bedside with no new complaints    MEDICATIONS  (STANDING):  amLODIPine   Tablet 10 milliGRAM(s) Oral daily  ampicillin/sulbactam  IVPB 3 Gram(s) IV Intermittent every 6 hours  atorvastatin 20 milliGRAM(s) Oral at bedtime  enoxaparin Injectable 40 milliGRAM(s) SubCutaneous every 24 hours  gabapentin 100 milliGRAM(s) Oral three times a day  insulin glargine Injectable (LANTUS) 6 Unit(s) SubCutaneous at bedtime  insulin lispro (ADMELOG) corrective regimen sliding scale   SubCutaneous Before meals and at bedtime  insulin lispro Injectable (ADMELOG) 2 Unit(s) SubCutaneous three times a day before meals  losartan 100 milliGRAM(s) Oral daily    MEDICATIONS  (PRN):  acetaminophen     Tablet .. 650 milliGRAM(s) Oral every 6 hours PRN Temp greater or equal to 38C (100.4F), Mild Pain (1 - 3)  melatonin 3 milliGRAM(s) Oral at bedtime PRN Insomnia  ondansetron Injectable 4 milliGRAM(s) IV Push every 8 hours PRN Nausea and/or Vomiting    __________________________________________________  REVIEW OF SYSTEMS:    CONSTITUTIONAL: No fever,   EYES: no acute visual disturbances  NECK: No pain or stiffness  RESPIRATORY: No cough; No shortness of breath  CARDIOVASCULAR: No chest pain, no palpitations  GASTROINTESTINAL: No pain. No nausea or vomiting; No diarrhea   NEUROLOGICAL: No headache or numbness, no tremors  MUSCULOSKELETAL: No joint pain, no muscle pain  GENITOURINARY: no dysuria, no frequency, no hesitancy  PSYCHIATRY: no depression , no anxiety  ALL OTHER  ROS negative      Vital Signs Last 24 Hrs  T(C): 36.3 (16 Feb 2024 13:58), Max: 36.6 (15 Feb 2024 21:19)  T(F): 97.3 (16 Feb 2024 13:58), Max: 97.8 (15 Feb 2024 21:19)  HR: 79 (16 Feb 2024 13:58) (67 - 79)  BP: 115/78 (16 Feb 2024 13:58) (115/78 - 136/81)  BP(mean): --  RR: 18 (16 Feb 2024 13:58) (17 - 18)  SpO2: 96% (16 Feb 2024 13:58) (96% - 98%)    Parameters below as of 16 Feb 2024 13:58  Patient On (Oxygen Delivery Method): room air  ________________________________________________  PHYSICAL EXAM:  GENERAL: NAD  HEENT: Normocephalic;  conjunctivae and sclerae clear; moist mucous membranes;   NECK : supple  CHEST/LUNG: Clear to auscultation bilaterally with good air entry   HEART: S1 S2  regular; no murmurs, gallops or rubs  ABDOMEN: Soft, Nontender, Nondistended; Bowel sounds present  EXTREMITIES: no cyanosis; no edema; no calf tenderness  SKIN: right thigh wound   NERVOUS SYSTEM:  Awake and alert; Oriented  to place, person and time ; no new deficits    _________________________________________________  LABS:                        14.1   5.36  )-----------( 241      ( 16 Feb 2024 08:30 )             41.6     02-16    134<L>  |  105  |  16  ----------------------------<  171<H>  3.8   |  22  |  1.01    Ca    9.6      16 Feb 2024 08:30      Urinalysis Basic - ( 16 Feb 2024 08:30 )    Color: x / Appearance: x / SG: x / pH: x  Gluc: 171 mg/dL / Ketone: x  / Bili: x / Urobili: x   Blood: x / Protein: x / Nitrite: x   Leuk Esterase: x / RBC: x / WBC x   Sq Epi: x / Non Sq Epi: x / Bacteria: x    CAPILLARY BLOOD GLUCOSE    POCT Blood Glucose.: 247 mg/dL (16 Feb 2024 11:14)  POCT Blood Glucose.: 150 mg/dL (16 Feb 2024 08:00)  POCT Blood Glucose.: 218 mg/dL (15 Feb 2024 21:41)  POCT Blood Glucose.: 192 mg/dL (15 Feb 2024 16:41)      RADIOLOGY & ADDITIONAL TESTS:  < from: CT Lower Extremity w/ IV Cont, Right (02.12.24 @ 18:06) >  PROCEDURE DATE:  02/12/2024          INTERPRETATION:  Exam Type: CT LOWER EXTREMITY WITH IV CONTRAST RIGHT  Exam Date and Time: 2/12/2024 6:06 PM  Indication: Right lower extremity pain. Wound.  Comparison: No relevant prior studies available for comparison.    TECHNIQUE:  Multiplanar CT images of the right thigh/femur were obtained after   administration of 90 cc of Omnipaque 350 (10 cc discarded).    FINDINGS:  There is thickening of the skin with abnormal nodularity/reticulation of   the subcutaneous fat at the anterior/lateral margin of the right mid   thigh. No drainable fluid collection is seen. This area of inflammatory   change/phlegmon extends to the underlyingvastus lateralis muscle.   However, no abnormal enhancement or collection is seen within this   muscle. There is no osseous destruction or periosteal reaction. No acute   fracture or dislocation. Right hip joint space is maintained. There is an   os acetabuli. Pubic symphysis is intact. No hip effusion. Knee joint   space is maintained. There is no knee effusion. A small low-attenuation   focus is noted within the scrotum, nonspecific.      IMPRESSION:  Findings of cellulitis/phlegmon change about the anterolateral mid thigh   tissues. No drainable fluid collection and no evidence of a deeper   infection.    --- End of Report ---      < from: [facility]iol Extremity Lower 3+ Level, BI (02.13.24 @ 09:33) >  PROCEDURE DATE:  02/13/2024        INTERPRETATION:  Clinical information: History of smoking, diabetes,   hypertension, hyperlipidemia, diminished distal pulses.    COMPARISON: None available.    TECHNIQUE: Lower extremity arterial Doppler study.    FINDINGS: Ankle brachial index measures 1.08 on the right and 1.20 on the   left. No segmental arterial pressure gradient is detected.    PVR waveforms are normal in amplitude and pulsatility throughout both   lower extremities.    IMPRESSION: No Doppler evidence of hemodynamically significant arterial   flow limitation in either lower extremity.    --- End of Report ---      Imaging Personally Reviewed:  YES    Consultant(s) Notes Reviewed:   YES    Care Discussed with Consultants :     Plan of care was discussed with patient and /or primary care giver; all questions and concerns were addressed and care was aligned with patient's wishes.

## 2024-02-16 NOTE — DIETITIAN INITIAL EVALUATION ADULT - PERTINENT MEDS FT
MEDICATIONS  (STANDING):  amLODIPine   Tablet 10 milliGRAM(s) Oral daily  ampicillin/sulbactam  IVPB 3 Gram(s) IV Intermittent every 6 hours  atorvastatin 20 milliGRAM(s) Oral at bedtime  enoxaparin Injectable 40 milliGRAM(s) SubCutaneous every 24 hours  gabapentin 100 milliGRAM(s) Oral three times a day  insulin glargine Injectable (LANTUS) 10 Unit(s) SubCutaneous at bedtime  insulin lispro (ADMELOG) corrective regimen sliding scale   SubCutaneous Before meals and at bedtime  insulin lispro Injectable (ADMELOG) 4 Unit(s) SubCutaneous three times a day before meals  losartan 100 milliGRAM(s) Oral daily    MEDICATIONS  (PRN):  acetaminophen     Tablet .. 650 milliGRAM(s) Oral every 6 hours PRN Temp greater or equal to 38C (100.4F), Mild Pain (1 - 3)  melatonin 3 milliGRAM(s) Oral at bedtime PRN Insomnia  ondansetron Injectable 4 milliGRAM(s) IV Push every 8 hours PRN Nausea and/or Vomiting

## 2024-02-16 NOTE — PROGRESS NOTE ADULT - TIME BILLING
- Review of records, vital signs and daily labs, microbiology and other Infectious Diseases related work up  - General physical examination performed  - Generation of Infectious Diseases treatment plan discussed with attending Physician  - Coordination of care with the multidisciplinary team  -Counseling of the patient and/or family members

## 2024-02-16 NOTE — PROGRESS NOTE ADULT - ASSESSMENT
You were seen today in the Cardiology office for evaluation of chest pain  Thank you for choosing [address]  Please call our office or use [employer] with any questions  Subjective: NAD, afebrile, no new symptoms or complains.    REVIEW OF SYSTEMS:  CONSTITUTIONAL:  No fevers or chills  EYES/ENT:  No visual changes; no throat pain   NECK:  No neck pain or stiffness  RESPIRATORY:  No cough, no wheezing. No shortness of breath  CARDIOVASCULAR:  No chest pain or palpitations  GASTROINTESTINAL:  No abdominal pain. No N/V or diarrhea  GENITOURINARY:  No dysuria, frequency or hematuria  NEUROLOGICAL:  No numbness or weakness  MSK: no back pain, no joint pain, R thigh wound +  SKIN:  No itching, no skin rash    PE:  Vital Signs Last 24 Hrs  T(C): 36.3 (16 Feb 2024 13:58), Max: 36.6 (15 Feb 2024 21:19)  T(F): 97.3 (16 Feb 2024 13:58), Max: 97.8 (15 Feb 2024 21:19)  HR: 79 (16 Feb 2024 13:58) (67 - 79)  BP: 115/78 (16 Feb 2024 13:58) (115/78 - 136/81)  RR: 18 (16 Feb 2024 13:58) (17 - 18)  SpO2: 96% (16 Feb 2024 13:58) (96% - 98%)    Parameters below as of 16 Feb 2024 13:58  Patient On (Oxygen Delivery Method): room air    Gen: AOx3, NAD, non-toxic, pleasant  HEAD:  Atraumatic  EYES: PERRLA, conjunctiva and sclera clear  ENT: Moist mucous membranes  NECK: Supple, No JVD  CV: S1+S2 normal, no murmurs  Resp: Clear bilat, no resp distress, no crackles/wheezes  Abd: Soft, nontender, +BS  Ext: No LE edema, no cyanosis, pulses +  R thigh wound ~ 3 cm with pink clean base no purulence  : No dysuria  IV/Skin: No thrombophlebitis  Msk: No low back pain, no arthralgias, no joint swelling  Neuro: AAOx3. No focal signs     LABS/DIAGNOSTIC TESTS:                        14.1   5.36  )-----------( 241      ( 16 Feb 2024 08:30 )             41.6     WBC Count: 5.36 K/uL (02-16 @ 08:30)  WBC Count: 5.23 K/uL (02-15 @ 08:39)  WBC Count: 5.35 K/uL (02-14 @ 08:00)    02-16    134<L>  |  105  |  16  ----------------------------<  171<H>  3.8   |  22  |  1.01    Ca    9.6      16 Feb 2024 08:30    Sedimentation Rate, Erythrocyte: 66 mm/Hr *H* (02-12-24 @ 15:20)  C-Reactive Protein, Serum: 3 mg/L (02-12-24 @ 15:20)    CULTURES:   Culture - Surgical Swab (collected 02-14-24 @ 16:00)  Source: Drainage Drainage  Preliminary Report (02-15-24 @ 22:28):    Moderate Staphylococcus aureus    Culture - Other (collected 02-14-24 @ 11:28)  Source: Skin Skin  Preliminary Report (02-15-24 @ 15:24):    Numerous Staphylococcus aureus    Culture - Blood (collected 02-12-24 @ 15:30)  Source: .Blood Blood-Peripheral  Preliminary Report (02-15-24 @ 22:00):    No growth at 72 Hours    Culture - Blood (collected 02-12-24 @ 15:20)  Source: .Blood Blood-Peripheral  Preliminary Report (02-15-24 @ 22:00):    No growth at 72 Hours    RADIOLOGY:   Available pertinent Imaging reviewed    ANTIBIOTICS:  ampicillin/sulbactam  IVPB 3 every 6 hours    IMPRESSION:       PLAN:      Please reach ID with any questions or concerns  Dr. Emani Multani  Available in Teams               Subjective: NAD, afebrile, no new symptoms or complains.    REVIEW OF SYSTEMS:  CONSTITUTIONAL:  No fevers or chills  EYES/ENT:  No visual changes; no throat pain   NECK:  No neck pain or stiffness  RESPIRATORY:  No cough, no wheezing. No shortness of breath  CARDIOVASCULAR:  No chest pain or palpitations  GASTROINTESTINAL:  No abdominal pain. No N/V or diarrhea  GENITOURINARY:  No dysuria, frequency or hematuria  NEUROLOGICAL:  No numbness or weakness  MSK: no back pain, no joint pain, R thigh wound +  SKIN:  No itching, no skin rash    PE:  Vital Signs Last 24 Hrs  T(C): 36.3 (16 Feb 2024 13:58), Max: 36.6 (15 Feb 2024 21:19)  T(F): 97.3 (16 Feb 2024 13:58), Max: 97.8 (15 Feb 2024 21:19)  HR: 79 (16 Feb 2024 13:58) (67 - 79)  BP: 115/78 (16 Feb 2024 13:58) (115/78 - 136/81)  RR: 18 (16 Feb 2024 13:58) (17 - 18)  SpO2: 96% (16 Feb 2024 13:58) (96% - 98%)    Parameters below as of 16 Feb 2024 13:58  Patient On (Oxygen Delivery Method): room air    Gen: AOx3, NAD, non-toxic, pleasant  HEAD:  Atraumatic  EYES: PERRLA, conjunctiva and sclera clear  ENT: Moist mucous membranes  NECK: Supple, No JVD  CV: S1+S2 normal, no murmurs  Resp: Clear bilat, no resp distress, no crackles/wheezes  Abd: Soft, nontender, +BS  Ext: No LE edema, no cyanosis, pulses +  R thigh wound ~ 3 cm with pink clean base no purulence  : No dysuria  IV/Skin: No thrombophlebitis  Msk: No low back pain, no arthralgias, no joint swelling  Neuro: AAOx3. No focal signs     LABS/DIAGNOSTIC TESTS:                        14.1   5.36  )-----------( 241      ( 16 Feb 2024 08:30 )             41.6     WBC Count: 5.36 K/uL (02-16 @ 08:30)  WBC Count: 5.23 K/uL (02-15 @ 08:39)  WBC Count: 5.35 K/uL (02-14 @ 08:00)    02-16    134<L>  |  105  |  16  ----------------------------<  171<H>  3.8   |  22  |  1.01    Ca    9.6      16 Feb 2024 08:30    Sedimentation Rate, Erythrocyte: 66 mm/Hr *H* (02-12-24 @ 15:20)  C-Reactive Protein, Serum: 3 mg/L (02-12-24 @ 15:20)    CULTURES:   Culture - Surgical Swab (collected 02-14-24 @ 16:00)  Source: Drainage Drainage  Preliminary Report (02-15-24 @ 22:28):    Moderate Staphylococcus aureus  Culture - Surgical Swab (02.14.24 @ 16:00)    -  Cefazolin: S <=4   -  Clindamycin: S <=0.25   -  Erythromycin: S <=0.25   -  Gentamicin: S <=1 Should not be used as monotherapy   -  Oxacillin: S 0.5 Oxacillin predicts susceptibility for dicloxacillin, methicillin, and nafcillin   -  Penicillin: R >8   -  Rifampin: S <=1 Should not be used as monotherapy   -  Tetracycline: S <=1   -  Ampicillin/Sulbactam: S <=8/4   -  Trimethoprim/Sulfamethoxazole: S <=0.5/9.5   -  Vancomycin: S 2   Specimen Source: Drainage Drainage   Culture Results:   Moderate Staphylococcus aureus   Organism Identification: Staphylococcus aureus   Organism: Staphylococcus aureus   Method Type: CHRISTEL    Culture - Other (collected 02-14-24 @ 11:28)  Source: Skin Skin  Preliminary Report (02-15-24 @ 15:24):    Numerous Staphylococcus aureus    Culture - Blood (collected 02-12-24 @ 15:30)  Source: .Blood Blood-Peripheral  Preliminary Report (02-15-24 @ 22:00):    No growth at 72 Hours    Culture - Blood (collected 02-12-24 @ 15:20)  Source: .Blood Blood-Peripheral  Preliminary Report (02-15-24 @ 22:00):    No growth at 72 Hours    RADIOLOGY:   Available pertinent Imaging reviewed    ANTIBIOTICS:  ampicillin/sulbactam  IVPB 3 every 6 hours    IMPRESSION:  59 y/o M with PMH of DM, HTN, HLD, osteomyelitis s/p right ray amputation (7 months ago), alcohol use disorder admitted for R thigh infected wound cellulitis and uncontrolled DM.   No leukocytosis, no significant elevation of inflammatory markers, no recent hospitalization or abx use.   CT RLE reviewed with evidence of cellulitis/phlegmon.   A1C with Estimated Average Glucose Result: 11.4 (02.12.24 @ 15:20)  S/P wound debridement with surgery 2/14, sx swab wound cx + MSSA    -SSTI- R thigh infected ulcer/cellulitis  -Uncontrolled diabetes mellitus  -Alcohol abuse     PLAN:  Continue Unasyn 3g q6 hrs IV while inpatient once clear for discharge can transition to PO Augmentin 875/125 mg q12 hrs PO for 14 days until 2/28th  Wound care until wound closes by 2nd intention  Surgery follow up  DM control  Follow up with PMD  Discussed with Dr. Peñaloza    Please reach ID with any questions or concerns  Dr. Emani Multani  Available in Teams

## 2024-02-16 NOTE — PROGRESS NOTE ADULT - ASSESSMENT
59 y/o male with uncontrolled DM w/ R anterior thigh wound, s/p bedside debridement 2/14  VSS, no leukocytosis    Plan   - IV antibiotics, follow ID recs  - local wound care, daily dressing changes  - strict glucose control   - remainder of care by primary team   - signed out to carolina MEJIA [phone]

## 2024-02-17 VITALS
OXYGEN SATURATION: 98 % | RESPIRATION RATE: 18 BRPM | TEMPERATURE: 98 F | SYSTOLIC BLOOD PRESSURE: 109 MMHG | HEART RATE: 60 BPM | DIASTOLIC BLOOD PRESSURE: 70 MMHG

## 2024-02-17 LAB
ANION GAP SERPL CALC-SCNC: 5 MMOL/L — SIGNIFICANT CHANGE UP (ref 5–17)
BUN SERPL-MCNC: 17 MG/DL — SIGNIFICANT CHANGE UP (ref 7–18)
CALCIUM SERPL-MCNC: 9.1 MG/DL — SIGNIFICANT CHANGE UP (ref 8.4–10.5)
CHLORIDE SERPL-SCNC: 107 MMOL/L — SIGNIFICANT CHANGE UP (ref 96–108)
CO2 SERPL-SCNC: 24 MMOL/L — SIGNIFICANT CHANGE UP (ref 22–31)
CREAT SERPL-MCNC: 1.06 MG/DL — SIGNIFICANT CHANGE UP (ref 0.5–1.3)
CULTURE RESULTS: SIGNIFICANT CHANGE UP
CULTURE RESULTS: SIGNIFICANT CHANGE UP
EGFR: 80 ML/MIN/1.73M2 — SIGNIFICANT CHANGE UP
GLUCOSE BLDC GLUCOMTR-MCNC: 143 MG/DL — HIGH (ref 70–99)
GLUCOSE SERPL-MCNC: 172 MG/DL — HIGH (ref 70–99)
HCT VFR BLD CALC: 40.8 % — SIGNIFICANT CHANGE UP (ref 39–50)
HGB BLD-MCNC: 13.7 G/DL — SIGNIFICANT CHANGE UP (ref 13–17)
MCHC RBC-ENTMCNC: 31.5 PG — SIGNIFICANT CHANGE UP (ref 27–34)
MCHC RBC-ENTMCNC: 33.6 GM/DL — SIGNIFICANT CHANGE UP (ref 32–36)
MCV RBC AUTO: 93.8 FL — SIGNIFICANT CHANGE UP (ref 80–100)
NRBC # BLD: 0 /100 WBCS — SIGNIFICANT CHANGE UP (ref 0–0)
PLATELET # BLD AUTO: 231 K/UL — SIGNIFICANT CHANGE UP (ref 150–400)
POTASSIUM SERPL-MCNC: 4.2 MMOL/L — SIGNIFICANT CHANGE UP (ref 3.5–5.3)
POTASSIUM SERPL-SCNC: 4.2 MMOL/L — SIGNIFICANT CHANGE UP (ref 3.5–5.3)
RBC # BLD: 4.35 M/UL — SIGNIFICANT CHANGE UP (ref 4.2–5.8)
RBC # FLD: 12.3 % — SIGNIFICANT CHANGE UP (ref 10.3–14.5)
SODIUM SERPL-SCNC: 136 MMOL/L — SIGNIFICANT CHANGE UP (ref 135–145)
SPECIMEN SOURCE: SIGNIFICANT CHANGE UP
SPECIMEN SOURCE: SIGNIFICANT CHANGE UP
WBC # BLD: 6.14 K/UL — SIGNIFICANT CHANGE UP (ref 3.8–10.5)
WBC # FLD AUTO: 6.14 K/UL — SIGNIFICANT CHANGE UP (ref 3.8–10.5)

## 2024-02-17 PROCEDURE — 83605 ASSAY OF LACTIC ACID: CPT

## 2024-02-17 PROCEDURE — 96375 TX/PRO/DX INJ NEW DRUG ADDON: CPT

## 2024-02-17 PROCEDURE — 87186 SC STD MICRODIL/AGAR DIL: CPT

## 2024-02-17 PROCEDURE — 85027 COMPLETE CBC AUTOMATED: CPT

## 2024-02-17 PROCEDURE — 82962 GLUCOSE BLOOD TEST: CPT

## 2024-02-17 PROCEDURE — 87070 CULTURE OTHR SPECIMN AEROBIC: CPT

## 2024-02-17 PROCEDURE — 96374 THER/PROPH/DIAG INJ IV PUSH: CPT

## 2024-02-17 PROCEDURE — 80053 COMPREHEN METABOLIC PANEL: CPT

## 2024-02-17 PROCEDURE — 73701 CT LOWER EXTREMITY W/DYE: CPT | Mod: MA

## 2024-02-17 PROCEDURE — 99285 EMERGENCY DEPT VISIT HI MDM: CPT | Mod: 25

## 2024-02-17 PROCEDURE — 36415 COLL VENOUS BLD VENIPUNCTURE: CPT

## 2024-02-17 PROCEDURE — 85652 RBC SED RATE AUTOMATED: CPT

## 2024-02-17 PROCEDURE — 87077 CULTURE AEROBIC IDENTIFY: CPT

## 2024-02-17 PROCEDURE — 99239 HOSP IP/OBS DSCHRG MGMT >30: CPT

## 2024-02-17 PROCEDURE — 93923 UPR/LXTR ART STDY 3+ LVLS: CPT

## 2024-02-17 PROCEDURE — 85730 THROMBOPLASTIN TIME PARTIAL: CPT

## 2024-02-17 PROCEDURE — 87040 BLOOD CULTURE FOR BACTERIA: CPT

## 2024-02-17 PROCEDURE — 85025 COMPLETE CBC W/AUTO DIFF WBC: CPT

## 2024-02-17 PROCEDURE — 86803 HEPATITIS C AB TEST: CPT

## 2024-02-17 PROCEDURE — 83036 HEMOGLOBIN GLYCOSYLATED A1C: CPT

## 2024-02-17 PROCEDURE — 84100 ASSAY OF PHOSPHORUS: CPT

## 2024-02-17 PROCEDURE — 85610 PROTHROMBIN TIME: CPT

## 2024-02-17 PROCEDURE — 86140 C-REACTIVE PROTEIN: CPT

## 2024-02-17 PROCEDURE — 80048 BASIC METABOLIC PNL TOTAL CA: CPT

## 2024-02-17 PROCEDURE — 83735 ASSAY OF MAGNESIUM: CPT

## 2024-02-17 RX ORDER — ATORVASTATIN CALCIUM 80 MG/1
1 TABLET, FILM COATED ORAL
Refills: 0 | DISCHARGE

## 2024-02-17 RX ORDER — LINAGLIPTIN 5 MG/1
1 TABLET, FILM COATED ORAL
Refills: 0 | DISCHARGE

## 2024-02-17 RX ORDER — LOSARTAN POTASSIUM 100 MG/1
1 TABLET, FILM COATED ORAL
Qty: 0 | Refills: 0 | DISCHARGE
Start: 2024-02-17

## 2024-02-17 RX ORDER — METFORMIN HYDROCHLORIDE 850 MG/1
1 TABLET ORAL
Refills: 0 | DISCHARGE

## 2024-02-17 RX ORDER — LOSARTAN POTASSIUM 100 MG/1
1 TABLET, FILM COATED ORAL
Refills: 0 | DISCHARGE

## 2024-02-17 RX ORDER — COLLAGENASE CLOSTRIDIUM HIST. 250 UNIT/G
1 OINTMENT (GRAM) TOPICAL
Qty: 1 | Refills: 0
Start: 2024-02-17 | End: 2024-03-17

## 2024-02-17 RX ORDER — AMLODIPINE BESYLATE 2.5 MG/1
1 TABLET ORAL
Refills: 0 | DISCHARGE

## 2024-02-17 RX ORDER — AMLODIPINE BESYLATE 2.5 MG/1
1 TABLET ORAL
Qty: 0 | Refills: 0 | DISCHARGE
Start: 2024-02-17

## 2024-02-17 RX ORDER — ATORVASTATIN CALCIUM 80 MG/1
1 TABLET, FILM COATED ORAL
Qty: 0 | Refills: 0 | DISCHARGE
Start: 2024-02-17

## 2024-02-17 RX ORDER — GABAPENTIN 400 MG/1
1 CAPSULE ORAL
Refills: 0 | DISCHARGE

## 2024-02-17 RX ORDER — GABAPENTIN 400 MG/1
1 CAPSULE ORAL
Qty: 0 | Refills: 0 | DISCHARGE
Start: 2024-02-17

## 2024-02-17 RX ORDER — INSULIN LISPRO 100/ML
4 VIAL (ML) SUBCUTANEOUS
Qty: 1 | Refills: 0
Start: 2024-02-17 | End: 2024-03-17

## 2024-02-17 RX ORDER — INSULIN GLARGINE 100 [IU]/ML
10 INJECTION, SOLUTION SUBCUTANEOUS
Qty: 1 | Refills: 0
Start: 2024-02-17 | End: 2024-03-17

## 2024-02-17 RX ADMIN — Medication 4 UNIT(S): at 12:56

## 2024-02-17 RX ADMIN — Medication 1: at 12:55

## 2024-02-17 RX ADMIN — ENOXAPARIN SODIUM 40 MILLIGRAM(S): 100 INJECTION SUBCUTANEOUS at 00:37

## 2024-02-17 RX ADMIN — AMPICILLIN SODIUM AND SULBACTAM SODIUM 200 GRAM(S): 250; 125 INJECTION, POWDER, FOR SUSPENSION INTRAMUSCULAR; INTRAVENOUS at 00:38

## 2024-02-17 RX ADMIN — GABAPENTIN 100 MILLIGRAM(S): 400 CAPSULE ORAL at 06:05

## 2024-02-17 RX ADMIN — Medication 4 UNIT(S): at 08:42

## 2024-02-17 RX ADMIN — AMPICILLIN SODIUM AND SULBACTAM SODIUM 200 GRAM(S): 250; 125 INJECTION, POWDER, FOR SUSPENSION INTRAMUSCULAR; INTRAVENOUS at 06:05

## 2024-02-17 RX ADMIN — AMPICILLIN SODIUM AND SULBACTAM SODIUM 200 GRAM(S): 250; 125 INJECTION, POWDER, FOR SUSPENSION INTRAMUSCULAR; INTRAVENOUS at 12:55

## 2024-02-17 NOTE — CHART NOTE - NSCHARTNOTEFT_GEN_A_CORE
Patient insists on leaving AMA and claims that he has wounscare arranged for his right foot. Reinterated the information that its for a different body part. He ensured that he will get wound care for his right theigh. Verbalized on the imprtance of contintinuing treatment Patient insists on leaving AMA and claims that he has wound care arranged for his right foot. Re-iterated the information that its for a different body part. He ensured that he will get wound care for his right thiegh. Verbalized on the importance of continuing treatment. [facility] will continue to follow in arranging wound care services for patient.

## 2024-02-17 NOTE — PROGRESS NOTE ADULT - SUBJECTIVE AND OBJECTIVE BOX
Interval Events:  pt in and    Allergies    No Known Allergies    Intolerances      Endocrine/Metabolic Medications:  atorvastatin 20 milliGRAM(s) Oral at bedtime  insulin glargine Injectable (LANTUS) 10 Unit(s) SubCutaneous at bedtime  insulin lispro (ADMELOG) corrective regimen sliding scale   SubCutaneous Before meals and at bedtime  insulin lispro Injectable (ADMELOG) 4 Unit(s) SubCutaneous three times a day before meals      Vital Signs Last 24 Hrs  T(C): 36.5 (17 Feb 2024 05:29), Max: 36.9 (16 Feb 2024 20:55)  T(F): 97.7 (17 Feb 2024 05:29), Max: 98.4 (16 Feb 2024 20:55)  HR: 60 (17 Feb 2024 05:29) (60 - 79)  BP: 109/70 (17 Feb 2024 05:29) (109/70 - 115/78)  BP(mean): --  RR: 18 (17 Feb 2024 05:29) (18 - 18)  SpO2: 98% (17 Feb 2024 05:29) (96% - 98%)    Parameters below as of 17 Feb 2024 05:29  Patient On (Oxygen Delivery Method): room air          PHYSICAL EXAM  All physical exam findings normal, except those marked:  General:	Alert, active, cooperative, NAD, well hydrated  .		[] Abnormal:  Neck		Normal: supple, no cervical adenopathy, no palpable thyroid  .		[] Abnormal:  Cardiovascular	Normal: regular rate, normal S1, S2, no murmurs  .		[] Abnormal:  Respiratory	Normal: no chest wall deformity, normal respiratory pattern, CTA B/L  .		[] Abnormal:  Abdominal	Normal: soft, ND, NT, bowel sounds present, no masses, no organomegaly  .		[] Abnormal:  		Normal normal genitalia, testes descended, circumcised/uncircumcised  .		Ange stage:			Breast ange:  .		Menstrual history:  .		[] Abnormal:  Extremities	Normal: FROM x4  .		[] Abnormal:  Skin		Normal: intact and not indurated, no rash, no acanthosis nigricans  .		[] Abnormal:  Neurologic	Normal: grossly intact  .		[] Abnormal:    LABS                        13.7   6.14  )-----------( 231      ( 17 Feb 2024 07:15 )             40.8                               136    |  107    |  17                  Calcium: 9.1   / iCa: x      (02-17 @ 07:15)    ----------------------------<  172       Magnesium: x                                4.2     |  24     |  1.06             Phosphorous: x          CAPILLARY BLOOD GLUCOSE      POCT Blood Glucose.: 186 mg/dL (17 Feb 2024 11:59)  POCT Blood Glucose.: 143 mg/dL (17 Feb 2024 08:01)  POCT Blood Glucose.: 214 mg/dL (16 Feb 2024 22:26)  POCT Blood Glucose.: 262 mg/dL (16 Feb 2024 21:01)  POCT Blood Glucose.: 236 mg/dL (16 Feb 2024 16:57)        Assesment/plan        Assesment/plan    Patient is a 59 y/o M with PMH of DM, HTN, HLD, osteomyelitis s/p right ray amputation (7 months ago), alcohol use disorder who presented with right anterior thigh wound for 3 weeks.  Pt presents with uncontrolled diabetes for which he takes multiple oral agents at home. Pt now has infected wound on his right thigh.       Problem/Recommendation - 1:  ·  Problem: Diabetes mellitus.   ·  Recommendation: Pt takes multiple oral agents at home including metformin, Glipizide, and Tradjenta  A1C: 11.4  better controlled  cont  Lantus to 10  and Lispro 4 premeal tid  cont correction doses   fsg ac and hs  nutrition eval  adjust meds upon d/c, pt amenable to basal/bolus insulin.   dm teaching       Problem/Recommendation - 2:  ·  Problem: Cellulitis of right thigh.   ·  Recommendation: Bedside debridement performed by surgery  continue local wound care and antibiotics.

## 2024-02-17 NOTE — PROGRESS NOTE ADULT - REASON FOR ADMISSION
Right anterior thigh wound.

## 2024-02-17 NOTE — PROGRESS NOTE ADULT - PROVIDER SPECIALTY LIST ADULT
Endocrinology
Infectious Disease
Internal Medicine
Internal Medicine
Endocrinology
Endocrinology
Infectious Disease
Internal Medicine
Surgery
Internal Medicine
Infectious Disease
Internal Medicine

## 2024-02-17 NOTE — PROGRESS NOTE ADULT - SUBJECTIVE AND OBJECTIVE BOX
Hospitalist Attending Progress Note    Please contact on TEAMS TILL 5:00 PM  PLEASE CONTACT ON CALL TEAM:  - On Call Team (Please refer to Syl) FROM 5:00 PM - 8:30PM  - Nightfloat Team FROM 8:30 -7:30 AM    CHIEF COMPLAINT & BRIEF HOSPITAL COURSE:  Patient is a 59 y/o M with PMHx of DM, HTN, HLD, osteomyelitis s/p right ray amputation (7 months ago), and alcohol use disorder who presented with right anterior thigh wound x 3 weeks. CT showed cellulitis/phlegmon change around the anterolateral mid thigh without drainable fluid collection or evidence of a deeper infection. ANDIE WNL. He was started on unasyn and ID was consulted; S/P bedside debridement of right thigh wound 2/14 by surgery. He also had an elevated A1c of 11.4 for which Endocrine Dr. Mueller was consulted, and insulin regimen was adjusted. Patient no longer wants to take OHAs.     INTERVAL HPI/OVERNIGHT EVENTS:   Patient had insulin teaching and has used insulin prior (knows how to do subq abdominal injections. He reports that he has previously had wound care come to his home and take care of his lower extremity wound. It was stressed that currently we did not have an accepting wound care program for his thigh wound and he wanted to leave anyway with full understanding that wound may worsen if not cared to properly in the immediate discharge period. Importance of DM care and wound care were stressed and patient verbalized understanding.    REVIEW OF SYSTEMS:  CONSTITUTIONAL: No fever, weight loss, or fatigue  RESPIRATORY: No cough, wheezing, chills or hemoptysis; No shortness of breath  CARDIOVASCULAR: No chest pain, palpitations, dizziness, or leg swelling  GASTROINTESTINAL: No abdominal pain. No nausea, vomiting, or hematemesis; No diarrhea or constipation. No melena or hematochezia.  GENITOURINARY: No dysuria or hematuria, urinary frequency  NEUROLOGICAL: No headaches, memory loss, loss of strength, numbness, or tremors  SKIN: R anterolateral thigh pain at site of wound     MEDICATIONS  (STANDING):  amLODIPine   Tablet 10 milliGRAM(s) Oral daily  ampicillin/sulbactam  IVPB 3 Gram(s) IV Intermittent every 6 hours  atorvastatin 20 milliGRAM(s) Oral at bedtime  enoxaparin Injectable 40 milliGRAM(s) SubCutaneous every 24 hours  gabapentin 100 milliGRAM(s) Oral three times a day  insulin glargine Injectable (LANTUS) 10 Unit(s) SubCutaneous at bedtime  insulin lispro (ADMELOG) corrective regimen sliding scale   SubCutaneous Before meals and at bedtime  insulin lispro Injectable (ADMELOG) 4 Unit(s) SubCutaneous three times a day before meals  losartan 100 milliGRAM(s) Oral daily    MEDICATIONS  (PRN):  acetaminophen     Tablet .. 650 milliGRAM(s) Oral every 6 hours PRN Temp greater or equal to 38C (100.4F), Mild Pain (1 - 3)  melatonin 3 milliGRAM(s) Oral at bedtime PRN Insomnia  ondansetron Injectable 4 milliGRAM(s) IV Push every 8 hours PRN Nausea and/or Vomiting      Vital Signs Last 24 Hrs  T(C): 36.5 (17 Feb 2024 05:29), Max: 36.9 (16 Feb 2024 20:55)  T(F): 97.7 (17 Feb 2024 05:29), Max: 98.4 (16 Feb 2024 20:55)  HR: 60 (17 Feb 2024 05:29) (60 - 60)  BP: 109/70 (17 Feb 2024 05:29) (109/70 - 115/74)  BP(mean): --  RR: 18 (17 Feb 2024 05:29) (18 - 18)  SpO2: 98% (17 Feb 2024 05:29) (96% - 98%)    Parameters below as of 17 Feb 2024 05:29  Patient On (Oxygen Delivery Method): room air        PHYSICAL EXAMINATION:  GENERAL: NAD, well built  HEAD:  Atraumatic, Normocephalic  EYES:  conjunctiva and sclera clear  NECK: Supple, No JVD, Normal thyroid  CHEST/LUNG: Clear to auscultation. Clear to percussion bilaterally; No rales, rhonchi, wheezing, or rubs  HEART: Regular rate and rhythm; No murmurs, rubs, or gallops  ABDOMEN: Soft, Nontender, Nondistended; Bowel sounds present  NERVOUS SYSTEM:  Alert & Oriented X3, no motor deficit   EXTREMITIES:  2+ Peripheral Pulses, No clubbing, cyanosis, or edema  SKIN: R anterolateral thigh wound; wrapped in gauze and taped.                          13.7   6.14  )-----------( 231      ( 17 Feb 2024 07:15 )             40.8     02-17    136  |  107  |  17  ----------------------------<  172<H>  4.2   |  24  |  1.06    Ca    9.1      17 Feb 2024 07:15                CAPILLARY BLOOD GLUCOSE      RADIOLOGY & ADDITIONAL TESTS:

## 2024-02-17 NOTE — DISCHARGE NOTE NURSING/CASE MANAGEMENT/SOCIAL WORK - PATIENT PORTAL LINK FT
You can access the FollowMyHealth Patient Portal offered by [facility] by registering at the following website: [email or web address]. By joining [facility]’s FollowMyHealth portal, you will also be able to view your health information using other applications (apps) compatible with our system.

## 2024-02-17 NOTE — PROGRESS NOTE ADULT - ASSESSMENT
Patient is a 59 y/o M with PMHx of DM, HTN, HLD, osteomyelitis s/p right ray amputation (7 months ago), and alcohol use disorder who presented with right anterior thigh wound x 3 weeks. Admitted for uncontrolled DM and R thigh cellulitis.    ASSESSMENT & PLAN   # R Anterior Thigh Cellulitis   # Uncontrolled DM   # HTN   # HLD   # Hx of Osteomyelitis s/p Ray Amputation     Pt received insulin teaching but reports that he will not wait for wound care teaching/ home wound care set up   Stressed importance of follow up and good compliance to prevent recurrence   R anterior thigh wound is improving and patient is instructed to wash, apply Santyl, moist occlusive dressing and cover with gauze.   Stop all home OHAs, c/w new insulin basal bolus regimen as prescribed   C/w Augmentin x 11 days to finish course.  Remaining care as per discharge summary

## 2024-02-17 NOTE — DISCHARGE NOTE NURSING/CASE MANAGEMENT/SOCIAL WORK - NSDCPEFALRISK_GEN_ALL_CORE
For information on Fall & Injury Prevention, visit: [email or web address] OR  [email or web address] OR  https://www.cdc.gov/steadi/patient.html

## 2024-02-19 LAB
CULTURE RESULTS: ABNORMAL
ORGANISM # SPEC MICROSCOPIC CNT: ABNORMAL
ORGANISM # SPEC MICROSCOPIC CNT: ABNORMAL
SPECIMEN SOURCE: SIGNIFICANT CHANGE UP

## 2024-09-18 NOTE — ED PROVIDER NOTE - CONSTITUTIONAL MOOD
Chevy Andrade Patient Age: 34 year old  MESSAGE: Interpreting service used: No    Insurance on file confirmed with caller: Yes    Orthopaedics/Podiatry/Sports Medicine- Reason for call: Orders- Question about Approved/Pending Order-      Type of order:  Wheelchair       Question about order:  [facility] calling stating they need the order that was sent over for this patient to include their height and weight. Fax [phone]    Routed message to provider's clinical support pool.    Message read back to caller for accuracy: Yes       ALLERGIES:  Patient has no known allergies.  Current Outpatient Medications   Medication Sig Dispense Refill    traMADol (Ultram) 50 MG tablet Take 1 tablet by mouth every 6 hours as needed for Pain. 30 tablet 0    sulfamethoxazole-trimethoprim (BACTRIM DS) 800-160 MG per tablet Take 1 tablet by mouth daily.      amoxicillin-clavulanate (AUGMENTIN) 875-125 MG per tablet TAKE 1 TABLET BY MOUTH TWICE DAILY FOR 14 DAYS      cephalexin (KEFLEX) 500 MG capsule Take 500 mg by mouth in the morning and 500 mg at noon and 500 mg in the evening.      cefUROXime (CEFTIN) 500 MG tablet Take 500 mg by mouth in the morning and 500 mg in the evening. For 10 days       No current facility-administered medications for this visit.     PHARMACY to use:           Pharmacy preference(s) on file:   [id number] - [place], [place] - [address] AT SEC OF [facility] [address]  [address]  [facility] [address]  Phone: [phone] Fax: [phone]      CALL BACK INFO: Ok to leave response (including medical information) on answering machine      PCP: Pcp, Verify         INS: Payor: [employer] / Plan: CHOICE [id number] / Product Type: POS MISC   PATIENT ADDRESS:  [address] [place] [id number]     
Pt instructed to present to [facility] following LOV yesterday, 9/17. Will obtain current height/weight with this admission & fax to [facility] as requested.   
AGITATED/INAPPROPRIATE